# Patient Record
Sex: MALE
[De-identification: names, ages, dates, MRNs, and addresses within clinical notes are randomized per-mention and may not be internally consistent; named-entity substitution may affect disease eponyms.]

---

## 2018-12-26 NOTE — ASU PATIENT PROFILE, ADULT - PMH
Arthritis    Prostate mass  benign non nodular prostatic hyperplasia without urinary tract symptoms  Spermatocele

## 2018-12-26 NOTE — ASU PATIENT PROFILE, ADULT - PSH
History of ankle surgery  bilateral ankles, right 10/19/2017  History of facial surgery  excision tumor of lip and reconstruction  S/P tonsillectomy

## 2018-12-27 ENCOUNTER — RESULT REVIEW (OUTPATIENT)
Age: 58
End: 2018-12-27

## 2018-12-27 ENCOUNTER — OUTPATIENT (OUTPATIENT)
Dept: OUTPATIENT SERVICES | Facility: HOSPITAL | Age: 58
LOS: 1 days | Discharge: HOME | End: 2018-12-27
Payer: COMMERCIAL

## 2018-12-27 VITALS
RESPIRATION RATE: 14 BRPM | HEART RATE: 66 BPM | WEIGHT: 205.03 LBS | TEMPERATURE: 98 F | HEIGHT: 72 IN | SYSTOLIC BLOOD PRESSURE: 116 MMHG | OXYGEN SATURATION: 98 % | DIASTOLIC BLOOD PRESSURE: 75 MMHG

## 2018-12-27 VITALS
OXYGEN SATURATION: 97 % | SYSTOLIC BLOOD PRESSURE: 116 MMHG | RESPIRATION RATE: 16 BRPM | DIASTOLIC BLOOD PRESSURE: 77 MMHG | TEMPERATURE: 98 F | HEART RATE: 65 BPM

## 2018-12-27 DIAGNOSIS — Z98.890 OTHER SPECIFIED POSTPROCEDURAL STATES: Chronic | ICD-10-CM

## 2018-12-27 DIAGNOSIS — Z90.89 ACQUIRED ABSENCE OF OTHER ORGANS: Chronic | ICD-10-CM

## 2018-12-27 PROCEDURE — 88304 TISSUE EXAM BY PATHOLOGIST: CPT | Mod: 26

## 2018-12-27 RX ORDER — FENTANYL CITRATE 50 UG/ML
50 INJECTION INTRAVENOUS
Qty: 0 | Refills: 0 | Status: DISCONTINUED | OUTPATIENT
Start: 2018-12-27 | End: 2018-12-27

## 2018-12-27 RX ORDER — OXYCODONE HYDROCHLORIDE 5 MG/1
5 TABLET ORAL ONCE
Qty: 0 | Refills: 0 | Status: DISCONTINUED | OUTPATIENT
Start: 2018-12-27 | End: 2018-12-27

## 2018-12-27 RX ORDER — SODIUM CHLORIDE 9 MG/ML
1000 INJECTION, SOLUTION INTRAVENOUS
Qty: 0 | Refills: 0 | Status: DISCONTINUED | OUTPATIENT
Start: 2018-12-27 | End: 2019-01-11

## 2018-12-27 RX ORDER — MELOXICAM 15 MG/1
1 TABLET ORAL
Qty: 0 | Refills: 0 | COMMUNITY

## 2018-12-27 RX ORDER — OXYCODONE HYDROCHLORIDE 5 MG/1
1 TABLET ORAL
Qty: 0 | Refills: 0 | COMMUNITY
Start: 2018-12-27

## 2018-12-27 RX ORDER — ONDANSETRON 8 MG/1
4 TABLET, FILM COATED ORAL ONCE
Qty: 0 | Refills: 0 | Status: DISCONTINUED | OUTPATIENT
Start: 2018-12-27 | End: 2019-01-11

## 2018-12-27 RX ORDER — OXYCODONE HYDROCHLORIDE 5 MG/1
10 TABLET ORAL ONCE
Qty: 0 | Refills: 0 | Status: DISCONTINUED | OUTPATIENT
Start: 2018-12-27 | End: 2018-12-27

## 2018-12-27 NOTE — ASU DISCHARGE PLAN (ADULT/PEDIATRIC). - MEDICATION SUMMARY - MEDICATIONS TO TAKE
I will START or STAY ON the medications listed below when I get home from the hospital:    Aleve  -- Indication: For Spermatocele    oxyCODONE 5 mg oral tablet  -- 1 tab(s) by mouth once, As needed, Moderate Pain (4 - 6)  -- Indication: For History of ankle surgery    oxyCODONE 10 mg oral tablet  -- 1 tab(s) by mouth once, As needed, Severe Pain (7 - 10)  -- Indication: For History of ankle surgery

## 2018-12-31 LAB — SURGICAL PATHOLOGY FINAL REPORT - CH: SIGNIFICANT CHANGE UP

## 2019-01-01 DIAGNOSIS — N43.40 SPERMATOCELE OF EPIDIDYMIS, UNSPECIFIED: ICD-10-CM

## 2019-01-01 DIAGNOSIS — Z88.5 ALLERGY STATUS TO NARCOTIC AGENT: ICD-10-CM

## 2019-01-24 ENCOUNTER — EMERGENCY (EMERGENCY)
Facility: HOSPITAL | Age: 59
LOS: 0 days | Discharge: HOME | End: 2019-01-24
Attending: EMERGENCY MEDICINE | Admitting: EMERGENCY MEDICINE
Payer: COMMERCIAL

## 2019-01-24 VITALS
TEMPERATURE: 98 F | RESPIRATION RATE: 17 BRPM | HEART RATE: 74 BPM | OXYGEN SATURATION: 94 % | DIASTOLIC BLOOD PRESSURE: 70 MMHG | SYSTOLIC BLOOD PRESSURE: 118 MMHG

## 2019-01-24 VITALS — WEIGHT: 205.03 LBS | HEIGHT: 72 IN

## 2019-01-24 DIAGNOSIS — M62.830 MUSCLE SPASM OF BACK: ICD-10-CM

## 2019-01-24 DIAGNOSIS — Z98.890 OTHER SPECIFIED POSTPROCEDURAL STATES: Chronic | ICD-10-CM

## 2019-01-24 DIAGNOSIS — Z90.89 ACQUIRED ABSENCE OF OTHER ORGANS: Chronic | ICD-10-CM

## 2019-01-24 DIAGNOSIS — M19.90 UNSPECIFIED OSTEOARTHRITIS, UNSPECIFIED SITE: ICD-10-CM

## 2019-01-24 DIAGNOSIS — M54.5 LOW BACK PAIN: ICD-10-CM

## 2019-01-24 PROBLEM — N43.40 SPERMATOCELE OF EPIDIDYMIS, UNSPECIFIED: Chronic | Status: ACTIVE | Noted: 2018-12-26

## 2019-01-24 PROBLEM — N42.9 DISORDER OF PROSTATE, UNSPECIFIED: Chronic | Status: ACTIVE | Noted: 2018-12-26

## 2019-01-24 LAB
ALBUMIN SERPL ELPH-MCNC: 3.9 G/DL — SIGNIFICANT CHANGE UP (ref 3.3–5)
ALP SERPL-CCNC: 100 U/L — SIGNIFICANT CHANGE UP (ref 40–120)
ALT FLD-CCNC: 36 U/L — SIGNIFICANT CHANGE UP (ref 12–78)
ANION GAP SERPL CALC-SCNC: 9 MMOL/L — SIGNIFICANT CHANGE UP (ref 5–17)
APPEARANCE UR: CLEAR — SIGNIFICANT CHANGE UP
AST SERPL-CCNC: 22 U/L — SIGNIFICANT CHANGE UP (ref 15–37)
BACTERIA # UR AUTO: ABNORMAL
BASOPHILS # BLD AUTO: 0.11 K/UL — SIGNIFICANT CHANGE UP (ref 0–0.2)
BASOPHILS NFR BLD AUTO: 1 % — SIGNIFICANT CHANGE UP (ref 0–2)
BILIRUB SERPL-MCNC: 0.7 MG/DL — SIGNIFICANT CHANGE UP (ref 0.2–1.2)
BILIRUB UR-MCNC: NEGATIVE — SIGNIFICANT CHANGE UP
BUN SERPL-MCNC: 20 MG/DL — SIGNIFICANT CHANGE UP (ref 7–23)
CALCIUM SERPL-MCNC: 9.2 MG/DL — SIGNIFICANT CHANGE UP (ref 8.5–10.1)
CHLORIDE SERPL-SCNC: 106 MMOL/L — SIGNIFICANT CHANGE UP (ref 96–108)
CO2 SERPL-SCNC: 25 MMOL/L — SIGNIFICANT CHANGE UP (ref 22–31)
COLOR SPEC: YELLOW — SIGNIFICANT CHANGE UP
CREAT SERPL-MCNC: 1.21 MG/DL — SIGNIFICANT CHANGE UP (ref 0.5–1.3)
DIFF PNL FLD: NEGATIVE — SIGNIFICANT CHANGE UP
EOSINOPHIL # BLD AUTO: 0.13 K/UL — SIGNIFICANT CHANGE UP (ref 0–0.5)
EOSINOPHIL NFR BLD AUTO: 1.2 % — SIGNIFICANT CHANGE UP (ref 0–6)
EPI CELLS # UR: NEGATIVE — SIGNIFICANT CHANGE UP
GLUCOSE SERPL-MCNC: 124 MG/DL — HIGH (ref 70–99)
GLUCOSE UR QL: NEGATIVE MG/DL — SIGNIFICANT CHANGE UP
HCT VFR BLD CALC: 43.3 % — SIGNIFICANT CHANGE UP (ref 39–50)
HGB BLD-MCNC: 14.8 G/DL — SIGNIFICANT CHANGE UP (ref 13–17)
IMM GRANULOCYTES NFR BLD AUTO: 0.2 % — SIGNIFICANT CHANGE UP (ref 0–1.5)
KETONES UR-MCNC: NEGATIVE — SIGNIFICANT CHANGE UP
LEUKOCYTE ESTERASE UR-ACNC: ABNORMAL
LYMPHOCYTES # BLD AUTO: 0.97 K/UL — LOW (ref 1–3.3)
LYMPHOCYTES # BLD AUTO: 9 % — LOW (ref 13–44)
MCHC RBC-ENTMCNC: 33.7 PG — SIGNIFICANT CHANGE UP (ref 27–34)
MCHC RBC-ENTMCNC: 34.2 GM/DL — SIGNIFICANT CHANGE UP (ref 32–36)
MCV RBC AUTO: 98.6 FL — SIGNIFICANT CHANGE UP (ref 80–100)
MONOCYTES # BLD AUTO: 1.09 K/UL — HIGH (ref 0–0.9)
MONOCYTES NFR BLD AUTO: 10.1 % — SIGNIFICANT CHANGE UP (ref 2–14)
NEUTROPHILS # BLD AUTO: 8.42 K/UL — HIGH (ref 1.8–7.4)
NEUTROPHILS NFR BLD AUTO: 78.5 % — HIGH (ref 43–77)
NITRITE UR-MCNC: NEGATIVE — SIGNIFICANT CHANGE UP
NRBC # BLD: 0 /100 WBCS — SIGNIFICANT CHANGE UP (ref 0–0)
PH UR: 7 — SIGNIFICANT CHANGE UP (ref 5–8)
PLATELET # BLD AUTO: 206 K/UL — SIGNIFICANT CHANGE UP (ref 150–400)
POTASSIUM SERPL-MCNC: 4.5 MMOL/L — SIGNIFICANT CHANGE UP (ref 3.5–5.3)
POTASSIUM SERPL-SCNC: 4.5 MMOL/L — SIGNIFICANT CHANGE UP (ref 3.5–5.3)
PROT SERPL-MCNC: 7.2 GM/DL — SIGNIFICANT CHANGE UP (ref 6–8.3)
PROT UR-MCNC: 15 MG/DL
RBC # BLD: 4.39 M/UL — SIGNIFICANT CHANGE UP (ref 4.2–5.8)
RBC # FLD: 12.1 % — SIGNIFICANT CHANGE UP (ref 10.3–14.5)
RBC CASTS # UR COMP ASSIST: SIGNIFICANT CHANGE UP /HPF (ref 0–4)
SODIUM SERPL-SCNC: 140 MMOL/L — SIGNIFICANT CHANGE UP (ref 135–145)
SP GR SPEC: 1.01 — SIGNIFICANT CHANGE UP (ref 1.01–1.02)
UROBILINOGEN FLD QL: NEGATIVE MG/DL — SIGNIFICANT CHANGE UP
WBC # BLD: 10.74 K/UL — HIGH (ref 3.8–10.5)
WBC # FLD AUTO: 10.74 K/UL — HIGH (ref 3.8–10.5)
WBC UR QL: SIGNIFICANT CHANGE UP

## 2019-01-24 PROCEDURE — 99284 EMERGENCY DEPT VISIT MOD MDM: CPT | Mod: 25

## 2019-01-24 RX ORDER — DIAZEPAM 5 MG
5 TABLET ORAL ONCE
Qty: 0 | Refills: 0 | Status: DISCONTINUED | OUTPATIENT
Start: 2019-01-24 | End: 2019-01-24

## 2019-01-24 RX ORDER — IBUPROFEN 200 MG
1 TABLET ORAL
Qty: 15 | Refills: 0 | OUTPATIENT
Start: 2019-01-24 | End: 2019-01-28

## 2019-01-24 RX ORDER — OXYCODONE AND ACETAMINOPHEN 5; 325 MG/1; MG/1
1 TABLET ORAL ONCE
Qty: 0 | Refills: 0 | Status: DISCONTINUED | OUTPATIENT
Start: 2019-01-24 | End: 2019-01-24

## 2019-01-24 RX ORDER — METHOCARBAMOL 500 MG/1
1 TABLET, FILM COATED ORAL
Qty: 10 | Refills: 0 | OUTPATIENT
Start: 2019-01-24 | End: 2019-01-28

## 2019-01-24 RX ORDER — LIDOCAINE 4 G/100G
1 CREAM TOPICAL ONCE
Qty: 0 | Refills: 0 | Status: COMPLETED | OUTPATIENT
Start: 2019-01-24 | End: 2019-01-24

## 2019-01-24 RX ORDER — DEXAMETHASONE 0.5 MG/5ML
8 ELIXIR ORAL ONCE
Qty: 0 | Refills: 0 | Status: COMPLETED | OUTPATIENT
Start: 2019-01-24 | End: 2019-01-24

## 2019-01-24 RX ORDER — KETOROLAC TROMETHAMINE 30 MG/ML
15 SYRINGE (ML) INJECTION ONCE
Qty: 0 | Refills: 0 | Status: DISCONTINUED | OUTPATIENT
Start: 2019-01-24 | End: 2019-01-24

## 2019-01-24 RX ADMIN — OXYCODONE AND ACETAMINOPHEN 1 TABLET(S): 5; 325 TABLET ORAL at 16:53

## 2019-01-24 RX ADMIN — Medication 5 MILLIGRAM(S): at 15:09

## 2019-01-24 RX ADMIN — LIDOCAINE 1 PATCH: 4 CREAM TOPICAL at 15:08

## 2019-01-24 RX ADMIN — Medication 15 MILLIGRAM(S): at 16:53

## 2019-01-24 RX ADMIN — Medication 8 MILLIGRAM(S): at 15:09

## 2019-01-24 NOTE — ED ADULT TRIAGE NOTE - CHIEF COMPLAINT QUOTE
Patient comes in with right lower back pain x 1 day. patient denies injury. patient states he had numbness in hand last night.

## 2019-01-24 NOTE — ED STATDOCS - CARE PROVIDER_API CALL
Devin Gill), Orthopaedic Surgery  [address] [facility]  [address] [place] [address]  Phone: [phone]  Fax: [phone]

## 2019-01-24 NOTE — ED STATDOCS - PROGRESS NOTE DETAILS
57 y/o M with a PMHx of Arthritis, on Aleve, presenting to the ED with wife c/o lower right spasming back pain starting last night. Pain radiating to buttocks. No recent falls or other trauma. Denies any leg pain, extremity weakness, dysuria, hematuria, incontinence, CP, SOB, abd pain, N/V/D, fevers, or chills. Last dose Aleve 0600 this morning. Allergic to Demerol. PE back: moderate tenderness to right lower back, no L-Spine tenderness, no CVA tenderness. plan: pain meds, kidney fx testing, ivfs and reeval. -Kirstie Quiñonez PA-C AJM: workup neg. pt feeling improved. ambulatory. dc home. All results discussed with the patient, and a copy of results has been provided. Patient is comfortable with dc plan for home. Opportunity for questions was provided and all questions have been adressed.

## 2019-01-24 NOTE — ED STATDOCS - DATA REVIEWED, MDM
Discussion/Summary   Right hand fifth digit is unremarkable for fracture or dislocation. Verified Results  XR FINGER 5TH RT MIN 2V [id number] 10:50AM AMANDEEP SCRUGGS     Test Name Result Flag Reference   XR FINGER 5TH RT MIN 2V (Report)     Accession #    [id number]    EXAM: XR FINGER 5TH RT MIN 2V    CLINICAL INDICATION: 73-year-old male presents with complaint of pain status post falling. COMPARISON: None. FINDINGS: The bony structures, soft tissues and joint spaces are unremarkable. There is no   evidence for a fracture or dislocation. IMPRESSION:     Unremarkable right 5th finger.     **** F I N A L ****    Transcribed By: Angelica Blum   09/10/18 2:51 pm    Dictated By:      Екатерина Mendoza MD    Electronically Reviewed and Approved By:      Екатерина Mendoza MD 09/10/18 2:54 pm
vital signs

## 2019-01-24 NOTE — ED ADULT NURSE NOTE - OBJECTIVE STATEMENT
pt complaining of right lower back pain that started overnight. pt denies any trauma, fall, or known strain to back. pt took 3 aleve without relief. denies hx of kidney stones. tender when touching area

## 2019-01-24 NOTE — ED STATDOCS - OBJECTIVE STATEMENT
57 y/o M with a PMHx of Arthritis, on Aleve, presenting to the ED with wife c/o lower right spasming back pain starting last night. Pain radiating to buttocks. No recent falls or other trauma. Denies any leg pain, extremity weakness, dysuria, hematuria, incontinence, CP, SOB, abd pain, N/V/D, fevers, or chills. Last dose Aleve 0600 this morning. Allergic to Demerol. 59 y/o M with a PMHx of Arthritis, on Aleve, presenting to the ED with wife c/o lower right spasming back pain starting last night. Pain radiating to buttocks. No recent falls or other trauma. Denies any leg pain, extremity weakness, dysuria, hematuria, incontinence, CP, SOB, abd pain, N/V/D, fevers, or chills. Last dose Aleve 0600 this morning. No trauma. No abd pain. No testicular pain. Allergic to Demerol.

## 2019-01-24 NOTE — ED STATDOCS - MEDICAL DECISION MAKING DETAILS
Pt presenting with muscle spasm back pain. No red flag back pain signs. Will treat with Valium, Lidoderm, Dexamethasone. Check kidney function and if normal, add NSAID.

## 2019-01-26 ENCOUNTER — INPATIENT (INPATIENT)
Facility: HOSPITAL | Age: 59
LOS: 1 days | Discharge: HOME | End: 2019-01-28
Attending: ORTHOPAEDIC SURGERY | Admitting: ORTHOPAEDIC SURGERY
Payer: COMMERCIAL

## 2019-01-26 VITALS
TEMPERATURE: 98 F | HEART RATE: 68 BPM | SYSTOLIC BLOOD PRESSURE: 117 MMHG | DIASTOLIC BLOOD PRESSURE: 74 MMHG | OXYGEN SATURATION: 96 % | RESPIRATION RATE: 16 BRPM

## 2019-01-26 DIAGNOSIS — Z90.89 ACQUIRED ABSENCE OF OTHER ORGANS: Chronic | ICD-10-CM

## 2019-01-26 DIAGNOSIS — Z98.890 OTHER SPECIFIED POSTPROCEDURAL STATES: Chronic | ICD-10-CM

## 2019-01-26 LAB
ADD ON TEST-SPECIMEN IN LAB: SIGNIFICANT CHANGE UP
ALBUMIN SERPL ELPH-MCNC: 3.2 G/DL — LOW (ref 3.3–5)
ALP SERPL-CCNC: 82 U/L — SIGNIFICANT CHANGE UP (ref 40–120)
ALT FLD-CCNC: 26 U/L — SIGNIFICANT CHANGE UP (ref 12–78)
ANION GAP SERPL CALC-SCNC: 8 MMOL/L — SIGNIFICANT CHANGE UP (ref 5–17)
AST SERPL-CCNC: 17 U/L — SIGNIFICANT CHANGE UP (ref 15–37)
BILIRUB SERPL-MCNC: 0.5 MG/DL — SIGNIFICANT CHANGE UP (ref 0.2–1.2)
BUN SERPL-MCNC: 16 MG/DL — SIGNIFICANT CHANGE UP (ref 7–23)
CALCIUM SERPL-MCNC: 8.7 MG/DL — SIGNIFICANT CHANGE UP (ref 8.5–10.1)
CHLORIDE SERPL-SCNC: 108 MMOL/L — SIGNIFICANT CHANGE UP (ref 96–108)
CO2 SERPL-SCNC: 25 MMOL/L — SIGNIFICANT CHANGE UP (ref 22–31)
CREAT SERPL-MCNC: 1.12 MG/DL — SIGNIFICANT CHANGE UP (ref 0.5–1.3)
GLUCOSE SERPL-MCNC: 160 MG/DL — HIGH (ref 70–99)
HCT VFR BLD CALC: 42 % — SIGNIFICANT CHANGE UP (ref 39–50)
HGB BLD-MCNC: 14 G/DL — SIGNIFICANT CHANGE UP (ref 13–17)
MCHC RBC-ENTMCNC: 33.3 GM/DL — SIGNIFICANT CHANGE UP (ref 32–36)
MCHC RBC-ENTMCNC: 33.7 PG — SIGNIFICANT CHANGE UP (ref 27–34)
MCV RBC AUTO: 101 FL — HIGH (ref 80–100)
NRBC # BLD: 0 /100 WBCS — SIGNIFICANT CHANGE UP (ref 0–0)
PLATELET # BLD AUTO: 169 K/UL — SIGNIFICANT CHANGE UP (ref 150–400)
POTASSIUM SERPL-MCNC: 4.4 MMOL/L — SIGNIFICANT CHANGE UP (ref 3.5–5.3)
POTASSIUM SERPL-SCNC: 4.4 MMOL/L — SIGNIFICANT CHANGE UP (ref 3.5–5.3)
PROT SERPL-MCNC: 6.8 GM/DL — SIGNIFICANT CHANGE UP (ref 6–8.3)
RBC # BLD: 4.16 M/UL — LOW (ref 4.2–5.8)
RBC # FLD: 12.8 % — SIGNIFICANT CHANGE UP (ref 10.3–14.5)
SODIUM SERPL-SCNC: 141 MMOL/L — SIGNIFICANT CHANGE UP (ref 135–145)
WBC # BLD: 10.9 K/UL — HIGH (ref 3.8–10.5)
WBC # FLD AUTO: 10.9 K/UL — HIGH (ref 3.8–10.5)

## 2019-01-26 PROCEDURE — 74176 CT ABD & PELVIS W/O CONTRAST: CPT | Mod: 26

## 2019-01-26 PROCEDURE — 72100 X-RAY EXAM L-S SPINE 2/3 VWS: CPT | Mod: 26

## 2019-01-26 PROCEDURE — 99285 EMERGENCY DEPT VISIT HI MDM: CPT

## 2019-01-26 RX ORDER — CYCLOBENZAPRINE HYDROCHLORIDE 10 MG/1
5 TABLET, FILM COATED ORAL ONCE
Qty: 0 | Refills: 0 | Status: COMPLETED | OUTPATIENT
Start: 2019-01-26 | End: 2019-01-26

## 2019-01-26 RX ORDER — SENNA PLUS 8.6 MG/1
2 TABLET ORAL AT BEDTIME
Qty: 0 | Refills: 0 | Status: DISCONTINUED | OUTPATIENT
Start: 2019-01-26 | End: 2019-01-28

## 2019-01-26 RX ORDER — DIAZEPAM 5 MG
5 TABLET ORAL ONCE
Qty: 0 | Refills: 0 | Status: DISCONTINUED | OUTPATIENT
Start: 2019-01-26 | End: 2019-01-26

## 2019-01-26 RX ORDER — LIDOCAINE 4 G/100G
1 CREAM TOPICAL EVERY 24 HOURS
Qty: 0 | Refills: 0 | Status: DISCONTINUED | OUTPATIENT
Start: 2019-01-26 | End: 2019-01-28

## 2019-01-26 RX ORDER — PANTOPRAZOLE SODIUM 20 MG/1
40 TABLET, DELAYED RELEASE ORAL
Qty: 0 | Refills: 0 | Status: DISCONTINUED | OUTPATIENT
Start: 2019-01-26 | End: 2019-01-28

## 2019-01-26 RX ORDER — OXYCODONE HYDROCHLORIDE 5 MG/1
10 TABLET ORAL EVERY 4 HOURS
Qty: 0 | Refills: 0 | Status: DISCONTINUED | OUTPATIENT
Start: 2019-01-26 | End: 2019-01-28

## 2019-01-26 RX ORDER — CYCLOBENZAPRINE HYDROCHLORIDE 10 MG/1
10 TABLET, FILM COATED ORAL THREE TIMES A DAY
Qty: 0 | Refills: 0 | Status: DISCONTINUED | OUTPATIENT
Start: 2019-01-26 | End: 2019-01-28

## 2019-01-26 RX ORDER — DOCUSATE SODIUM 100 MG
100 CAPSULE ORAL THREE TIMES A DAY
Qty: 0 | Refills: 0 | Status: DISCONTINUED | OUTPATIENT
Start: 2019-01-26 | End: 2019-01-28

## 2019-01-26 RX ORDER — ONDANSETRON 8 MG/1
4 TABLET, FILM COATED ORAL EVERY 6 HOURS
Qty: 0 | Refills: 0 | Status: DISCONTINUED | OUTPATIENT
Start: 2019-01-26 | End: 2019-01-28

## 2019-01-26 RX ORDER — OXYCODONE AND ACETAMINOPHEN 5; 325 MG/1; MG/1
2 TABLET ORAL ONCE
Qty: 0 | Refills: 0 | Status: DISCONTINUED | OUTPATIENT
Start: 2019-01-26 | End: 2019-01-26

## 2019-01-26 RX ORDER — KETOROLAC TROMETHAMINE 30 MG/ML
30 SYRINGE (ML) INJECTION ONCE
Qty: 0 | Refills: 0 | Status: DISCONTINUED | OUTPATIENT
Start: 2019-01-26 | End: 2019-01-26

## 2019-01-26 RX ORDER — ACETAMINOPHEN 500 MG
650 TABLET ORAL EVERY 6 HOURS
Qty: 0 | Refills: 0 | Status: DISCONTINUED | OUTPATIENT
Start: 2019-01-26 | End: 2019-01-28

## 2019-01-26 RX ORDER — OXYCODONE HYDROCHLORIDE 5 MG/1
5 TABLET ORAL EVERY 4 HOURS
Qty: 0 | Refills: 0 | Status: DISCONTINUED | OUTPATIENT
Start: 2019-01-26 | End: 2019-01-28

## 2019-01-26 RX ORDER — SODIUM CHLORIDE 9 MG/ML
3 INJECTION INTRAMUSCULAR; INTRAVENOUS; SUBCUTANEOUS ONCE
Qty: 0 | Refills: 0 | Status: COMPLETED | OUTPATIENT
Start: 2019-01-26 | End: 2019-01-26

## 2019-01-26 RX ORDER — HYDROMORPHONE HYDROCHLORIDE 2 MG/ML
0.5 INJECTION INTRAMUSCULAR; INTRAVENOUS; SUBCUTANEOUS
Qty: 0 | Refills: 0 | Status: DISCONTINUED | OUTPATIENT
Start: 2019-01-26 | End: 2019-01-28

## 2019-01-26 RX ADMIN — OXYCODONE AND ACETAMINOPHEN 2 TABLET(S): 5; 325 TABLET ORAL at 15:52

## 2019-01-26 RX ADMIN — SODIUM CHLORIDE 3 MILLILITER(S): 9 INJECTION INTRAMUSCULAR; INTRAVENOUS; SUBCUTANEOUS at 18:52

## 2019-01-26 RX ADMIN — Medication 125 MILLIGRAM(S): at 18:52

## 2019-01-26 RX ADMIN — OXYCODONE AND ACETAMINOPHEN 2 TABLET(S): 5; 325 TABLET ORAL at 15:22

## 2019-01-26 RX ADMIN — CYCLOBENZAPRINE HYDROCHLORIDE 5 MILLIGRAM(S): 10 TABLET, FILM COATED ORAL at 23:30

## 2019-01-26 RX ADMIN — Medication 30 MILLIGRAM(S): at 19:07

## 2019-01-26 RX ADMIN — Medication 5 MILLIGRAM(S): at 18:52

## 2019-01-26 RX ADMIN — Medication 30 MILLIGRAM(S): at 18:52

## 2019-01-26 NOTE — ED PROVIDER NOTE - TOBACCO USE
Secondary to severe anemia, see above  Will assess for fall risk prior to dc home      Unknown if ever smoked

## 2019-01-26 NOTE — ED ADULT NURSE NOTE - CHPI ED NUR SYMPTOMS NEG
no tingling/no decreased eating/drinking/no nausea/no fever/no weakness/no chills/no vomiting/no dizziness

## 2019-01-26 NOTE — H&P ADULT - HISTORY OF PRESENT ILLNESS
59 y/o male with a PMHx of Arthritis presents to the ED c/o worsening back pain for the past week. Pt was seen in ED thursday for similar pain, was given muscle relaxers and discharged. Pt last took Ibuprofen and the muscle relaxers today right before coming in to the ED. Patient initially felt better but over the course of the past few days has become worse to the point the patient couldn't get out of bed today and had to call ambulance to ED. Patient denies any history if injury, overuse, or back straining incidents. Denies any radicular symptoms and no history of this pain or any back injuries in the past. Patient denies any issues with his bowel or bladder habbits, just that he has felt constipated and admits that it was so painful to sit down over the past day he wasn't able ambulate and have bowel movement.  Denies any fevers/chills/numbness/weakness/radicular symptoms    Patient recently had surgery at the end of december to remove a spermatocele with Dr. Mendez on 12/27/18.     CT in Xray was negative for Kidney stones    PAST MEDICAL & SURGICAL HISTORY:  Prostate mass: benign non nodular prostatic hyperplasia without urinary tract symptoms  Spermatocele  Arthritis  History of facial surgery: excision tumor of lip and reconstruction  S/P tonsillectomy  History of ankle surgery: bilateral ankles, right 10/19/2017    Home Medications:  Aleve:  (27 Dec 2018 13:10)  oxyCODONE 10 mg oral tablet: 1 tab(s) orally once, As needed, Severe Pain (7 - 10) (27 Dec 2018 15:26)  oxyCODONE 5 mg oral tablet: 1 tab(s) orally once, As needed, Moderate Pain (4 - 6) (27 Dec 2018 15:26)    Allergies  Demerol HCl (Unknown)    Vital Signs Last 24 Hrs  T(C): 36.5 (26 Jan 2019 14:35), Max: 36.6 (26 Jan 2019 14:33)  T(F): 97.7 (26 Jan 2019 14:35), Max: 97.9 (26 Jan 2019 14:33)  HR: 71 (26 Jan 2019 14:35) (68 - 71)  BP: 110/76 (26 Jan 2019 14:35) (110/76 - 117/74)  BP(mean): --  RR: 18 (26 Jan 2019 14:35) (16 - 18)  SpO2: 97% (26 Jan 2019 14:35) (96% - 97%)    PHYSICAL EXAM:  General; NAD w/ Wife at bedside  Hips/Pelvis:   Able to SLR bilaterally, although pain in his lumbar paraspinal region with Right sided SLR, Also experienced pain in his lumbar paraspinals with ankle plantar flexion on the right side. Negative log roll, heel strike. No pain on passive Int/Ext hip rotation.  Spine exam:  Neck: supple, NT,   Back: Tender and Tight paraspinal muscles Right Lumbar region, no bony tenderness to palpation midline of cervical, thoracic, lumbar spine  Spine:                       Motor exam:          Right Upper extremity: Delt 5/5, Biceps 5/5, Triceps 5/5, Wrist Ext 5/5, Wrist Flexion 5/5,  Strength 5/5         SILT C5-S1, No Clonus, Negative Adan, +RP, Compartments soft           Left Upper extremity: Delt 5/5, Biceps 5/5, Triceps 5/5, Wrist Ext 5/5, Wrist Flexion 5/5,  Strength 5/5         SILT C5-S1, No Clonus, Negative Adan, +RP, Compartments soft           Right Lower Extremity: Hip Flexion 5/5, Hip Extension 5/5, Knee Flexion 5/5, Knee Extension 5/5, Ankle Dorsiflexion 5/5,          Ankle Plantar Flexion 5/5, Extensor hallucis Longus 5/5         SILT L2-S1, No pain with SLR, Negative Clonus, Negative Babinski                  Left Lower Extremity: Hip Flexion 5/5, Hip Extension 5/5, Knee Flexion 5/5, Knee Extension 5/5, Ankle Dorsiflexion 5/5,          Ankle Plantar Flexion 5/5, Extensor hallucis Longus 5/5         SILT L2-S1, No pain with SLR, Negative Clonus, Negative Babinski

## 2019-01-26 NOTE — H&P ADULT - ASSESSMENT
A/P:  59 yo M w/ Right sided intractable low back pain and inability to ambulate 2/2 to pain  Admit for pain control and muscle relaxers  WBAT, PT consult tomorrow  MRI tomorrow to rule out lumbar pathology not seen on Xray/CT imaging  FU Labs  Pain control  Follow up CT official results  Dr. Soria Aware of plan and agrees with plan

## 2019-01-26 NOTE — ED ADULT NURSE NOTE - NSIMPLEMENTINTERV_GEN_ALL_ED
Implemented All Fall Risk Interventions:  [place] to call system. Call bell, personal items and telephone within reach. Instruct patient to call for assistance. Room bathroom lighting operational. Non-slip footwear when patient is off stretcher. Physically safe environment: no spills, clutter or unnecessary equipment. Stretcher in lowest position, wheels locked, appropriate side rails in place. Provide visual cue, wrist band, yellow gown, etc. Monitor gait and stability. Monitor for mental status changes and reorient to person, place, and time. Review medications for side effects contributing to fall risk. Reinforce activity limits and safety measures with patient and family.

## 2019-01-26 NOTE — CONSULT NOTE ADULT - SUBJECTIVE AND OBJECTIVE BOX
H  Pt denies any bowel/bladder dysfunction.     PAST MEDICAL & SURGICAL HISTORY:  Prostate mass: benign non nodular prostatic hyperplasia without urinary tract symptoms  Spermatocele  Arthritis  History of facial surgery: excision tumor of lip and reconstruction  S/P tonsillectomy  History of ankle surgery: bilateral ankles, right 10/19/2017    Home Medications:  Aleve:  (27 Dec 2018 13:10)  oxyCODONE 10 mg oral tablet: 1 tab(s) orally once, As needed, Severe Pain (7 - 10) (27 Dec 2018 15:26)  oxyCODONE 5 mg oral tablet: 1 tab(s) orally once, As needed, Moderate Pain (4 - 6) (27 Dec 2018 15:26)    Allergies    Demerol HCl (Unknown)    Intolerances                                                        CHIEF COMPLAINT:     HPI: Pt is a 58y complains of pain **, patient denies changes in bowel or bladder, Denies pain down legs, denies paresthesias, Symptoms began **.     PAST MEDICAL & SURGICAL HISTORY:  Prostate mass: benign non nodular prostatic hyperplasia without urinary tract symptoms  Spermatocele  Arthritis  History of facial surgery: excision tumor of lip and reconstruction  S/P tonsillectomy  History of ankle surgery: bilateral ankles, right 10/19/2017      FAMILY HISTORY:      SOCIAL HISTORY:     Vital Signs Last 24 Hrs  T(C): 36.5 (26 Jan 2019 14:35), Max: 36.6 (26 Jan 2019 14:33)  T(F): 97.7 (26 Jan 2019 14:35), Max: 97.9 (26 Jan 2019 14:33)  HR: 71 (26 Jan 2019 14:35) (68 - 71)  BP: 110/76 (26 Jan 2019 14:35) (110/76 - 117/74)  BP(mean): --  RR: 18 (26 Jan 2019 14:35) (16 - 18)  SpO2: 97% (26 Jan 2019 14:35) (96% - 97%)  I&O's Detail      LABS:      RADIOLOGY & ADDITIONAL STUDIES:    PHYSICAL EXAM:  General; Awake and alert, Oriented x 3  Hips/Pelvis:   Able to SLR bilaterally. Negative log roll, heel strike. No pain on passive Int/Ext hip rotation.  Spine exam:  Neck: supple, NT, Full Painless baseline AROM  Back:   Spine:                       Motor exam:          Right Upper extremity: Delt 5/5, Biceps 5/5, Triceps 5/5, Wrist Ext 5/5, Wrist Flexion 5/5,  Strength 5/5         SILT C5-S1, No Clonus, Negative Adan, +RP, Compartments soft           Left Upper extremity: Delt 5/5, Biceps 5/5, Triceps 5/5, Wrist Ext 5/5, Wrist Flexion 5/5,  Strength 5/5         SILT C5-S1, No Clonus, Negative Adan, +RP, Compartments soft           Right Lower Extremity: Hip Flexion 5/5, Hip Extension 5/5, Knee Flexion 5/5, Knee Extension 5/5, Ankle Dorsiflexion 5/5,          Ankle Plantar Flexion 5/5, Extensor hallucis Longus 5/5         SILT L2-S1, No pain with SLR, Negative Clonus, Negative Babinski                  Left Lower Extremity: Hip Flexion 5/5, Hip Extension 5/5, Knee Flexion 5/5, Knee Extension 5/5, Ankle Dorsiflexion 5/5,          Ankle Plantar Flexion 5/5, Extensor hallucis Longus 5/5         SILT L2-S1, No pain with SLR, Negative Clonus, Negative Babinski H  Pt denies any bowel/bladder dysfunction.     PAST MEDICAL & SURGICAL HISTORY:  Prostate mass: benign non nodular prostatic hyperplasia without urinary tract symptoms  Spermatocele  Arthritis  History of facial surgery: excision tumor of lip and reconstruction  S/P tonsillectomy  History of ankle surgery: bilateral ankles, right 10/19/2017    Home Medications:  Aleve:  (27 Dec 2018 13:10)  oxyCODONE 10 mg oral tablet: 1 tab(s) orally once, As needed, Severe Pain (7 - 10) (27 Dec 2018 15:26)  oxyCODONE 5 mg oral tablet: 1 tab(s) orally once, As needed, Moderate Pain (4 - 6) (27 Dec 2018 15:26)    Allergies    Demerol HCl (Unknown)    Intolerances    Vital Signs Last 24 Hrs  T(C): 36.5 (26 Jan 2019 14:35), Max: 36.6 (26 Jan 2019 14:33)  T(F): 97.7 (26 Jan 2019 14:35), Max: 97.9 (26 Jan 2019 14:33)  HR: 71 (26 Jan 2019 14:35) (68 - 71)  BP: 110/76 (26 Jan 2019 14:35) (110/76 - 117/74)  BP(mean): --  RR: 18 (26 Jan 2019 14:35) (16 - 18)  SpO2: 97% (26 Jan 2019 14:35) (96% - 97%)    PHYSICAL EXAM:  General; NAD w/ Wife at bedside  Hips/Pelvis:   Able to SLR bilaterally. Negative log roll, heel strike. No pain on passive Int/Ext hip rotation.  Spine exam:  Neck: supple, NT, Full Painless baseline AROM  Back:   Spine:                       Motor exam:          Right Upper extremity: Delt 5/5, Biceps 5/5, Triceps 5/5, Wrist Ext 5/5, Wrist Flexion 5/5,  Strength 5/5         SILT C5-S1, No Clonus, Negative Adan, +RP, Compartments soft           Left Upper extremity: Delt 5/5, Biceps 5/5, Triceps 5/5, Wrist Ext 5/5, Wrist Flexion 5/5,  Strength 5/5         SILT C5-S1, No Clonus, Negative Adan, +RP, Compartments soft           Right Lower Extremity: Hip Flexion 5/5, Hip Extension 5/5, Knee Flexion 5/5, Knee Extension 5/5, Ankle Dorsiflexion 5/5,          Ankle Plantar Flexion 5/5, Extensor hallucis Longus 5/5         SILT L2-S1, No pain with SLR, Negative Clonus, Negative Babinski                  Left Lower Extremity: Hip Flexion 5/5, Hip Extension 5/5, Knee Flexion 5/5, Knee Extension 5/5, Ankle Dorsiflexion 5/5,          Ankle Plantar Flexion 5/5, Extensor hallucis Longus 5/5         SILT L2-S1, No pain with SLR, Negative Clonus, Negative Babinski

## 2019-01-26 NOTE — ED ADULT NURSE NOTE - OBJECTIVE STATEMENT
Patient presents to ED c/o worsening back pain x 3 days. Pt was seen in ED three days ago for his pain, was ibuprofen and percocet and went home. Pt last took Ibuprofen and percocet today PTA. States that he has been unable to move due and ambulate secondary to the pain. Denies any numbness or tingling in the back. Denies any recent falls. States that he was moving some heavy furniture 2 weeks ago and thinks "maybe I pulled a muscle". Patient is able to wiggle his toes; +pedal pulses. Denies any incontinence of urine or stool. States that he noticed his urine is "very dark". Patient offers no other complaints. Patient presents to ED c/o worsening back pain x 3 days. Pt was seen in ED three days ago for his pain, was given ibuprofen, robaxin and percocet and went home. Pt last took Ibuprofen, robaxin and percocet today PTA. States that he has been unable to move due and ambulate secondary to the pain. Denies any numbness or tingling in the back. Denies any recent falls. States that he was moving some heavy furniture 2 weeks ago and thinks "maybe I pulled a muscle". Patient is able to wiggle his toes; +pedal pulses. Denies any incontinence of urine or stool. States that he noticed his urine is "very dark". Patient offers no other complaints. Patient presents to ED c/o worsening back pain x 3 days. Pt was seen in [facility] three days ago for his pain, was given ibuprofen, robaxin and percocet and went home. Pt last took Ibuprofen, robaxin and percocet today PTA. States that he has been unable to move and ambulate secondary to the pain. Denies any numbness or tingling in the back. Denies any recent falls. States that he was moving some heavy furniture 2 weeks ago and thinks "maybe I pulled a muscle". Patient is able to wiggle his toes; +pedal pulses. Denies any incontinence of urine or stool. States that he noticed his urine is "very dark". Patient offers no other complaints.

## 2019-01-26 NOTE — ED PROVIDER NOTE - MUSCULOSKELETAL, MLM
Spine appears normal, range of motion is not limited. No midline TTP. +right buttock pain Spine appears normal, range of motion is not limited. No midline TTP. +right buttock/sacralilliac tenderness

## 2019-01-27 LAB
APPEARANCE UR: CLEAR — SIGNIFICANT CHANGE UP
BILIRUB UR-MCNC: NEGATIVE — SIGNIFICANT CHANGE UP
COLOR SPEC: YELLOW — SIGNIFICANT CHANGE UP
CRP SERPL-MCNC: 12.62 MG/DL — HIGH (ref 0–0.4)
DIFF PNL FLD: NEGATIVE — SIGNIFICANT CHANGE UP
ERYTHROCYTE [SEDIMENTATION RATE] IN BLOOD: 20 MM/HR — SIGNIFICANT CHANGE UP (ref 0–20)
GLUCOSE UR QL: 250 MG/DL
KETONES UR-MCNC: NEGATIVE — SIGNIFICANT CHANGE UP
LEUKOCYTE ESTERASE UR-ACNC: NEGATIVE — SIGNIFICANT CHANGE UP
NITRITE UR-MCNC: NEGATIVE — SIGNIFICANT CHANGE UP
PH UR: 5 — SIGNIFICANT CHANGE UP (ref 5–8)
PROT UR-MCNC: NEGATIVE MG/DL — SIGNIFICANT CHANGE UP
SP GR SPEC: 1.01 — SIGNIFICANT CHANGE UP (ref 1.01–1.02)
UROBILINOGEN FLD QL: NEGATIVE MG/DL — SIGNIFICANT CHANGE UP

## 2019-01-27 PROCEDURE — 72148 MRI LUMBAR SPINE W/O DYE: CPT | Mod: 26

## 2019-01-27 RX ORDER — DEXAMETHASONE 0.5 MG/5ML
2 ELIXIR ORAL EVERY 6 HOURS
Qty: 0 | Refills: 0 | Status: DISCONTINUED | OUTPATIENT
Start: 2019-01-28 | End: 2019-01-28

## 2019-01-27 RX ORDER — DEXAMETHASONE 0.5 MG/5ML
ELIXIR ORAL
Qty: 0 | Refills: 0 | Status: DISCONTINUED | OUTPATIENT
Start: 2019-01-27 | End: 2019-01-28

## 2019-01-27 RX ORDER — DEXAMETHASONE 0.5 MG/5ML
4 ELIXIR ORAL EVERY 6 HOURS
Qty: 0 | Refills: 0 | Status: COMPLETED | OUTPATIENT
Start: 2019-01-27 | End: 2019-01-28

## 2019-01-27 RX ADMIN — LIDOCAINE 1 PATCH: 4 CREAM TOPICAL at 06:59

## 2019-01-27 RX ADMIN — LIDOCAINE 1 PATCH: 4 CREAM TOPICAL at 06:57

## 2019-01-27 RX ADMIN — Medication 4 MILLIGRAM(S): at 18:02

## 2019-01-27 RX ADMIN — LIDOCAINE 1 PATCH: 4 CREAM TOPICAL at 17:57

## 2019-01-27 RX ADMIN — Medication 100 MILLIGRAM(S): at 21:16

## 2019-01-27 RX ADMIN — SENNA PLUS 2 TABLET(S): 8.6 TABLET ORAL at 21:16

## 2019-01-27 RX ADMIN — Medication 4 MILLIGRAM(S): at 12:09

## 2019-01-27 NOTE — PHYSICAL THERAPY INITIAL EVALUATION ADULT - IMPAIRMENTS FOUND, PT EVAL
aerobic capacity/endurance/gait, locomotion, and balance/gross motor/muscle strength/ROM/ergonomics and body mechanics

## 2019-01-27 NOTE — PROGRESS NOTE ADULT - REASON FOR ADMISSION
Intractable Lumbar back pain, inability to ambulate
Intractable Lumbar back pain, inability to ambulate

## 2019-01-27 NOTE — PROGRESS NOTE ADULT - SUBJECTIVE AND OBJECTIVE BOX
Patient seen and examined, doing slightly better this morning although pain still severe. No new complaints, denies parasthesias/weakness/numbness/radiating pain down legs  MRI Pending today of Lumbar Spine  ESR Normal    PAST MEDICAL & SURGICAL HISTORY:  Prostate mass: benign non nodular prostatic hyperplasia without urinary tract symptoms  Spermatocele  Arthritis  History of facial surgery: excision tumor of lip and reconstruction  S/P tonsillectomy  History of ankle surgery: bilateral ankles, right 10/19/2017    PHYSICAL EXAM:  General; Awake and alert, Oriented x 3  Hips/Pelvis:   Difficulty with SLR RLE, Able to lift off bed but no further, Normal SLR Left leg. Negative log roll, heel strike. No pain on passive Int/Ext hip rotation.  Spine exam:  Neck: supple, NT, Full Painless baseline AROM  Back: Tenderness to Right paraspinal region, +Muscle hypertonicity  Spine:                       Motor exam:          Right Upper extremity: Delt 5/5, Biceps 5/5, Triceps 5/5, Wrist Ext 5/5, Wrist Flexion 5/5,  Strength 5/5         SILT C5-S1, No Clonus, Negative Adan, +RP, Compartments soft           Left Upper extremity: Delt 5/5, Biceps 5/5, Triceps 5/5, Wrist Ext 5/5, Wrist Flexion 5/5,  Strength 5/5         SILT C5-S1, No Clonus, Negative Adan, +RP, Compartments soft           Right Lower Extremity: Hip Flexion 5/5, Hip Extension 5/5, Knee Flexion 5/5, Knee Extension 5/5, Ankle Dorsiflexion 5/5,          Ankle Plantar Flexion 5/5, Extensor hallucis Longus 5/5         SILT L2-S1, No pain with SLR, Negative Clonus, Negative Babinski                  Left Lower Extremity: Hip Flexion 5/5, Hip Extension 5/5, Knee Flexion 5/5, Knee Extension 5/5, Ankle Dorsiflexion 5/5,          Ankle Plantar Flexion 5/5, Extensor hallucis Longus 5/5         SILT L2-S1, No pain with SLR, Negative Clonus, Negative Babinski

## 2019-01-27 NOTE — PHYSICAL THERAPY INITIAL EVALUATION ADULT - PLANNED THERAPY INTERVENTIONS, PT EVAL
lumbar stabilization/manual therapy techniques/ROM/stretching/strengthening/bed mobility training/gait training/transfer training/neuromuscular re-education/balance training

## 2019-01-27 NOTE — PROGRESS NOTE ADULT - ASSESSMENT
a/p 59 yo M w/ Right Lumbar intractable back pain and inability to ambulate    ·	Xray and CT no obvious deformities in lumbar spine  ·	MRI pending for today, Lumbar Spine  ·	Continue muscle relaxers Q8 hours  ·	Lidocaine patch for Right lumbar Region ordered  ·	Pain control  ·	WBAT, PT evaluation for today  ·	Dr. Mendez service made aware of patient readmission, unsure if any Removal of Spermocele post operative explanation for back pain  ·	CT negative for renal stones  ·	Bowel regimen as patient has been constipated  ·	ESR Normal, Afebrile, nothing to suggest infectious cause/Discitis at this time

## 2019-01-27 NOTE — PHYSICAL THERAPY INITIAL EVALUATION ADULT - PERTINENT HX OF CURRENT PROBLEM, REHAB EVAL
Increased back pain for a week. Came to ED 1/24 and sent home with pain meds and muscle relaxer. Came back to ED again for severe R LBP.

## 2019-01-27 NOTE — PHYSICAL THERAPY INITIAL EVALUATION ADULT - GAIT DEVIATIONS NOTED, PT EVAL
decreased velocity of limb motion/decreased stride length/increased time in double stance/decreased swing-to-stance ratio/decreased bradford/decreased step length/decreased weight-shifting ability

## 2019-01-27 NOTE — PROGRESS NOTE ADULT - SUBJECTIVE AND OBJECTIVE BOX
Chief Complaint: Back pain    History: Still with severe back pain. Denies any numbness or weakness.     OBJECTIVE:     Vital Signs Last 24 Hrs  T(C): 36.9 (27 Jan 2019 02:00), Max: 36.9 (26 Jan 2019 21:52)  T(F): 98.4 (27 Jan 2019 02:00), Max: 98.4 (26 Jan 2019 21:52)  HR: 66 (27 Jan 2019 02:00) (65 - 71)  BP: 104/60 (27 Jan 2019 02:00) (104/60 - 127/78)  BP(mean): --  RR: 18 (27 Jan 2019 02:00) (16 - 18)  SpO2: 94% (27 Jan 2019 02:00) (94% - 97%)    Physical Exam:         Awake and alert         HEENT - unremarkable         Neck - supple         Back: tender at LS region         Bilateral Upper Extremities:             Good Motor Strength             Sensation intact         Bilateral Lower Extremities             Good Motor Strength             Senation intact             I&O's Detail      LABS:                        14.0   10.90 )-----------( 169      ( 26 Jan 2019 22:33 )             42.0     01-26    141  |  108  |  16  ----------------------------<  160<H>  4.4   |  25  |  1.12    Ca    8.7      26 Jan 2019 22:33    TPro  6.8  /  Alb  3.2<L>  /  TBili  0.5  /  DBili  x   /  AST  17  /  ALT  26  /  AlkPhos  82  01-26      A/P :  58y Male with sever LBP  -    Continue Pain Management  -    MRI LS Spine  -    Continue present treatment

## 2019-01-28 ENCOUNTER — TRANSCRIPTION ENCOUNTER (OUTPATIENT)
Age: 59
End: 2019-01-28

## 2019-01-28 VITALS
HEART RATE: 67 BPM | DIASTOLIC BLOOD PRESSURE: 78 MMHG | OXYGEN SATURATION: 93 % | TEMPERATURE: 98 F | SYSTOLIC BLOOD PRESSURE: 118 MMHG | RESPIRATION RATE: 20 BRPM

## 2019-01-28 RX ADMIN — PANTOPRAZOLE SODIUM 40 MILLIGRAM(S): 20 TABLET, DELAYED RELEASE ORAL at 06:22

## 2019-01-28 RX ADMIN — Medication 100 MILLIGRAM(S): at 06:22

## 2019-01-28 RX ADMIN — Medication 4 MILLIGRAM(S): at 00:26

## 2019-01-28 RX ADMIN — LIDOCAINE 1 PATCH: 4 CREAM TOPICAL at 06:22

## 2019-01-28 RX ADMIN — LIDOCAINE 1 PATCH: 4 CREAM TOPICAL at 06:24

## 2019-01-28 RX ADMIN — Medication 4 MILLIGRAM(S): at 06:22

## 2019-01-28 NOTE — DISCHARGE NOTE ADULT - NS AS ACTIVITY OBS
No Heavy lifting/straining/Walking-Indoors allowed/Driving allowed/Showering allowed/Stairs allowed/Return to Work/School allowed

## 2019-01-28 NOTE — DISCHARGE NOTE ADULT - HOSPITAL COURSE
Patient admitted with acute onset of R sided LBP  Started on analgesia and steroids with marked improvement  Today patient comfortable, ambulating and transfering independently  Neuro without deficits

## 2019-01-28 NOTE — DISCHARGE NOTE ADULT - MEDICATION SUMMARY - MEDICATIONS TO TAKE
I will START or STAY ON the medications listed below when I get home from the hospital:    oxyCODONE 10 mg oral tablet  -- 1 tab(s) by mouth once, As needed, Severe Pain (7 - 10)  -- Indication: For ACUTE RIGHT SIDED LOW BACK PAIN     mg oral tablet  -- 1 tab(s) by mouth 3 times a day   -- Do not take this drug if you are pregnant.  It is very important that you take or use this exactly as directed.  Do not skip doses or discontinue unless directed by your doctor.  May cause drowsiness or dizziness.  Obtain medical advice before taking any non-prescription drugs as some may affect the action of this medication.  Take with food or milk.    -- Indication: For ACUTE RIGHT SIDED LOW BACK PAIN    Robaxin 500 mg oral tablet  -- 1 tab(s) by mouth 2 times a day   -- May cause drowsiness.  Alcohol may intensify this effect.  Use care when operating dangerous machinery.    -- Indication: For ACUTE RIGHT SIDED LOW BACK PAIN

## 2019-01-28 NOTE — DISCHARGE NOTE ADULT - CARE PROVIDER_API CALL
Solomon Soria), Orthopaedic Surgery  [address] [place] [address]  Phone: [phone]  Fax: [phone]

## 2019-01-28 NOTE — DISCHARGE NOTE ADULT - PATIENT PORTAL LINK FT
You can access the [email or web address], offered by [facility], by registering with the following website: [email or web address]

## 2019-02-01 DIAGNOSIS — M54.5 LOW BACK PAIN: ICD-10-CM

## 2019-02-01 DIAGNOSIS — Z88.6 ALLERGY STATUS TO ANALGESIC AGENT: ICD-10-CM

## 2019-02-01 DIAGNOSIS — N40.0 BENIGN PROSTATIC HYPERPLASIA WITHOUT LOWER URINARY TRACT SYMPTOMS: ICD-10-CM

## 2019-02-01 DIAGNOSIS — M19.90 UNSPECIFIED OSTEOARTHRITIS, UNSPECIFIED SITE: ICD-10-CM

## 2020-10-15 NOTE — PROVIDER CONTACT NOTE (OTHER) - SITUATION
Electrophysiology Consult Note:     CHIEF COMPLAINT:  Patient is a 86y old  Female who presents with a chief complaint of Syncope (15 Oct 2020 14:20)        HISTORY OF PRESENT ILLNESS:   HPI:  85 y/o female w/ PMHx HTN, HLD, CAD (RIAN x3 in 2015 as per MD note in [employer]), Hypothyroidism, and IBS who was BIBEMS to [facility] ED following a near syncopal episode. Patient reports she began to feel a "severe vertigo sensation" and felt like the whole world was spinning, associated w/ SOB. Patient states this lasted about 30 minutes, until the EMS personnel arrived to bring her to the hospital. Patient states she has experienced this sensation before, most recently in 06/2020 when she fell and hit her head on the sidewalk. Patient reports she was admitted to [facility] at that time and was discharged after two days. Patient denies any chest pain, diaphoresis, abdominal pain, nausea/vomiting, melena, and LE edema. Patient also denies any recent fever, chills, cough, or known sick contacts. As per ED signout, EMS personnel reported the patient was in rapid A-fib when they arrived to the scene and she was given IV Cardizem and IV Magnesium w/ subsequent improvement in her rate and rhythm and patient returned to sinus rhythm.           PAST MEDICAL & SURGICAL HISTORY:  IBS (irritable bowel syndrome)    Hyperlipidemia  Hyperlipidemia    Essential hypertension  HTN (hypertension)    Hypothyroidism  Hypothyroidism    History of partial thyroidectomy    H/O cardiac disorder  stents x2 secondary to heart attack third stents was inserted.    Other postprocedural status  Left and right shoulder x 2    Status post cataract extraction  S/P cataract surgery  bilateral        FAMILY HISTORY:  FHx: myocardial infarction  Mother    FHx: coronary artery disease  Father        SOCIAL HISTORY:    [x ] Non-smoker      Allergies    Demerol HCl (Vomiting)  penicillins (Rash)    Intolerances    	    MEDICATIONS:  MEDICATIONS  (STANDING):  amLODIPine   Tablet 5 milliGRAM(s) Oral daily  aspirin enteric coated 81 milliGRAM(s) Oral daily  atorvastatin 20 milliGRAM(s) Oral at bedtime  enoxaparin Injectable 40 milliGRAM(s) SubCutaneous every 24 hours  folic acid 1 milliGRAM(s) Oral daily  levothyroxine 75 MICROGram(s) Oral daily  metoprolol succinate ER 25 milliGRAM(s) Oral daily  senna 2 Tablet(s) Oral at bedtime    MEDICATIONS  (PRN):        REVIEW OF SYSTEMS:  CONSTITUTIONAL: No fever, weight loss, or fatigue  EYES: No eye pain, visual disturbances, or discharge  ENMT:  No difficulty hearing, tinnitus, vertigo; No sinus or throat pain  NECK: No pain or stiffness  RESPIRATORY: No cough, wheezing, chills or hemoptysis; No Shortness of Breath  CARDIOVASCULAR: No chest pain, palpitations, + dizziness, or leg swelling  GASTROINTESTINAL: No abdominal or epigastric pain. No nausea, vomiting, or hematemesis; No diarrhea or constipation  GENITOURINARY: No dysuria, frequency, hematuria, or incontinence  NEUROLOGICAL: No headaches, memory loss, loss of strength, numbness, or tremors  SKIN: No itching, burning, rashes, or lesions   LYMPH Nodes: No enlarged glands  ENDOCRINE: No heat or cold intolerance; No hair loss      PHYSICAL EXAM:  Vital Signs Last 24 Hrs  T(C): 36.3 (15 Oct 2020 14:27), Max: 36.4 (14 Oct 2020 22:30)  T(F): 97.4 (15 Oct 2020 14:27), Max: 97.5 (14 Oct 2020 22:30)  HR: 63 (15 Oct 2020 12:05) (54 - 81)  BP: 161/70 (15 Oct 2020 12:05) (125/60 - 161/70)  BP(mean): --  RR: 18 (15 Oct 2020 12:05) (16 - 20)  SpO2: 99% (15 Oct 2020 12:05) (99% - 100%)  Daily     Daily     Constitutional: NAD	  HEENT:   PERRL, EOMI	  CVS: S1 S2, No edema  Pulm: Lungs clear to auscultation	  GI:  Soft, Non-tender, + BS	  Ext: No LE edema  Neurologic: A&O x 3  Skin: No rash or lesion       	  LABS:	                         13.9   5.34  )-----------( 273      ( 15 Oct 2020 05:59 )             43.7     10-15    142  |  105  |  14  ----------------------------<  111<H>  4.8   |  26  |  1.00    Ca    9.5      15 Oct 2020 05:59  Mg     2.2     10-15    TPro  7.5  /  Alb  4.2  /  TBili  0.3  /  DBili  x   /  AST  20  /  ALT  14  /  AlkPhos  74  10-13    proBNP:   Lipid Profile:   HgA1c:   TSH: 	      EKG: < from: 12 Lead ECG (10.13.20 @ 15:39) >  Ventricular Rate 87 BPM    Atrial Rate 87 BPM    P-R Interval 138 ms    QRS Duration 78 ms    Q-T Interval 378 ms    QTC Calculation(Bazett) 454 ms    P Axis 67 degrees    R Axis -16 degrees    T Axis 63 degrees      Telemetry: sinus rhythm      - ECHO 6/5/2020 revealed EF 75-80%, mild MR and TR, PASP 37mmHg, no pericardial effusion     OFFICE IS AWARE OF CONSULT

## 2021-01-14 NOTE — ED ADULT NURSE NOTE - CAS EDN DISCHARGE INTERVENTIONS
Mammogram after 3-  Please schedule mammogram, you may call their contact number at [phone]. They are located at [address] [place] [address], [place] [address]    Colonoscopy:  You have been asked to see gastroenterology for a procedure or a consultation  Gastroenterology:   [facility] [phone]   [facility] [phone]   [place] [phone]   [facility] [phone]    Xray knee to determine next course of action      Medicare Wellness Visit  Plan for Preventive Care    A good way for you to stay healthy is to use preventive care.  Medicare covers many services that can help you stay healthy.* The goal of these services is to find any health problems as quickly as possible. Finding problems early can help make them easier to treat.  Your personal plan below lists the services you may need and when they are due.     Health Maintenance Summary     Shingles Vaccine (2 of 3)  Overdue since 1/9/2017    Colorectal Cancer Screen-   Overdue since 3/19/2020    Medicare Wellness Visit (Yearly)  Overdue since 3/25/2020    Depression Screening (Yearly)  Next due on 1/14/2022    Breast Cancer Screening (Every 2 Years)  Next due on 3/20/2022    DTaP/Tdap/Td Vaccine (2 - Td)  Next due on 9/18/2022    Hepatitis C Screening   Completed    Pneumococcal Vaccine 65+   Completed    Osteoporosis Screening   Completed    Influenza Vaccine   Completed    Meningococcal Vaccine   Aged Out    HPV Vaccine   Aged Out           Preventive Care for Women and Men    Heart Screenings (Cardiovascular):  · Blood tests are used to check your cholesterol, lipid and triglyceride levels. High levels can increase your risk for heart disease and stroke. High levels can be treated with medications, diet and exercise. Lowering your levels can help keep your heart and blood vessels healthy.  Your provider will order these tests if they are needed.    ·   Colorectal Screening:  · There are many tests that are used to check  for cancer of your colon and rectum. You and your provider should discuss what test is best for you and when to have it done.  Options include:  · Screening Colonoscopy: exam of the entire colon, seen through a flexible lighted tube.  · Flexible Sigmoidoscopy: exam of the last third (sigmoid portion) of the colon and rectum, seen through a flexible lighted tube.  · Cologuard DNA stool test: a sample of your stool is used to screen for cancer and unseen blood in your stool.  · Fecal Occult Blood Test: a sample of your stool is studied to find any unseen blood    Flu Shot:  · An immunization that helps to prevent influenza (the flu). You should get this every year. The best time to get the shot is in the fall.     Diabetes Screening:  · A test to measure sugar (glucose) in your blood is called a fasting blood sugar. Fasting means you cannot have food or drink for at least 8 hours before the test. This test can detect diabetes long before you may notice symptoms.    Glaucoma Screening:  · Glaucoma screening is performed by your eye doctor. The test measures the fluid pressure inside your eyes to determine if you have glaucoma.     Preventive Screening tests for Women    Screening Mammograms and Breast Exams:  · An x-ray of your breasts to check for breast cancer before you or your doctor may be able to feel it.  If breast cancer is found early it can usually be treated with success.      Bone Mass Measurements:  · A painless x-ray of your bone density to see if you are at risk for a broken bone. Bone density refers to the thickness of bones or how tightly the bone tissue is packed.        *Medicare pays for many preventive services to keep you healthy. For some of these services, you might have to pay a deductible, coinsurance, and / or copayment.  The amounts vary depending on the type of services you need and the kind of Medicare health plan you have.               IV intact

## 2022-11-28 NOTE — ASU DISCHARGE PLAN (ADULT/PEDIATRIC). - ACTIVITY LEVEL
Speech Therapy    Visit Type: Treatment  Nursing comments: RN consent to treat patient.  Patient will receive MRI today  Present at bedside: Mother  Subjective reported by: Mother    SUBJECTIVE   Patient alert and awake.  Patient reports has a good appetite and continues to use nosey cup for liquid intake.   Patient / Family Goals: maximize function    OBJECTIVE    Diet:  Oral: full oral feeding   - General pediatric diet   - Swallow Precautions:  Sit upright for all PO intake.  Follow 3 strikes precautions and stop eating with 3 s/s of aspiration.   Environment and Equipment:  Room air         Communication/Cognition:  Arousal and Alertness:     - appropriate responses to stimuli  Expression: Verbal:     - Patient participated in word fluency exercises by identifying a word given three descriptions.  Patient completed with appropriate response timing with 75% accuracy.  Patient presents with appropriate thought organization techniques and categorization skills throughout the exercise.    Cognition:     - Orientation: person and place    - Attention: age appropriate            ASSESSMENT                                                                        • Impairments: problem solving/executive function  • Functional Limitations: communication/cognition  • Skilled therapy is required to establish safe means of nutrition, hydration and medication administration  • Requires SLP Follow Up: Yes    Discharge Recommendations  Patient continues to tolerate pediatric general diet with no overt s/s of aspiration with use of safe swallow compensatory strategies and with use of nosey cup for liquid intake.  Continue cognitive-linguistic tasks to increase executive functioning, word fluency, orientation, and delayed memory skills.   SLP Referrals/Discharge Recommendations: Other (comments)    • Rehab Potential: good  • Progress: Progressing toward goals      Education:   Learning Assessment:  - Primary learner: mother  - Are  they ready to learn: yes  - Preferred learning style: verbal  - Barriers to learning: no barriers apparent at this time  Education provided during session:  - Receiving Education: patient's parent(s)  - aspiration precautions and cognition  - Results of above outlined education: Verbalizes understanding  Patient at end of session:    - location: in bed    - hand off to: nurse and family/caregiver    PLAN     Frequency: 4-5x    Interventions:  Communication/cognition therapy   Plan/Goal Agreement: Parent/caregiver agrees with goals and treatment plan    RECOMMENDATIONS  Diet: general diet, thin liquids  Communication / Cognition:   - low stimulation environment and provide short simple directions    GOALS    Short Term Goals:   1) Patient will continue to tolerate low stimulation for cognitive - linguistic and communication.    2) Patient to participate in VFSS when patient medically stable and with increased levels of alertness.  3.) Patient to participate in safe po trials to increase swallow safety and function when appropriate:  (Met)  4.) Patient will participate in cognitive-linguistic assessment. MET    Long Term Goals:   1. Patient to accept least restrictive diet without distress  2. Patient  to attend to/participate in age appropriate cognitive tasks  3. Patient to attend to/ participate in expressive and receptive language activities  Documented in the chart in the following areas: Assessment.      Therapy procedure time and total treatment time can be found documented on the Time Entry flowsheet.   no exercise

## 2022-11-29 ENCOUNTER — EMERGENCY (EMERGENCY)
Facility: HOSPITAL | Age: 62
LOS: 0 days | Discharge: HOME | End: 2022-11-29
Attending: EMERGENCY MEDICINE
Payer: COMMERCIAL

## 2022-11-29 VITALS
RESPIRATION RATE: 18 BRPM | OXYGEN SATURATION: 98 % | SYSTOLIC BLOOD PRESSURE: 116 MMHG | TEMPERATURE: 98 F | HEART RATE: 54 BPM | DIASTOLIC BLOOD PRESSURE: 73 MMHG

## 2022-11-29 VITALS
RESPIRATION RATE: 16 BRPM | OXYGEN SATURATION: 97 % | HEART RATE: 59 BPM | SYSTOLIC BLOOD PRESSURE: 116 MMHG | TEMPERATURE: 98 F | DIASTOLIC BLOOD PRESSURE: 78 MMHG
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
## 2022-11-29 DIAGNOSIS — Z90.89 ACQUIRED ABSENCE OF OTHER ORGANS: Chronic | ICD-10-CM

## 2022-11-29 DIAGNOSIS — M79.89 OTHER SPECIFIED SOFT TISSUE DISORDERS: ICD-10-CM

## 2022-11-29 DIAGNOSIS — S60.455A SUPERFICIAL FOREIGN BODY OF LEFT RING FINGER, INITIAL ENCOUNTER: ICD-10-CM

## 2022-11-29 DIAGNOSIS — L98.0 PYOGENIC GRANULOMA: ICD-10-CM

## 2022-11-29 DIAGNOSIS — Z98.890 OTHER SPECIFIED POSTPROCEDURAL STATES: Chronic | ICD-10-CM

## 2022-11-29 DIAGNOSIS — L92.9 GRANULOMATOUS DISORDER OF THE SKIN AND SUBCUTANEOUS TISSUE, UNSPECIFIED: ICD-10-CM
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
## 2022-11-29 DIAGNOSIS — Z88.5 ALLERGY STATUS TO NARCOTIC AGENT: ICD-10-CM

## 2022-11-29 PROCEDURE — 99283 EMERGENCY DEPT VISIT LOW MDM: CPT

## 2022-11-29 PROCEDURE — 73140 X-RAY EXAM OF FINGER(S): CPT | Mod: 26,LT

## 2022-11-29 PROCEDURE — 99283 EMERGENCY DEPT VISIT LOW MDM: CPT | Mod: 25

## 2022-11-29 PROCEDURE — 73140 X-RAY EXAM OF FINGER(S): CPT | Mod: LT

## 2022-11-29 RX ORDER — CEPHALEXIN 500 MG
1 CAPSULE ORAL
Qty: 21 | Refills: 0
Start: 2022-11-29 | End: 2022-12-05

## 2022-11-29 NOTE — ED STATDOCS - PROGRESS NOTE DETAILS
spoke to Dr. Richard and recommend silver nitrate to area of granuloma, antibiotics and will see in the office. pt well appearing on dc and agrees with plan. -Kirstie Quiñonez PA-C

## 2022-11-29 NOTE — ED STATDOCS - CLINICAL SUMMARY MEDICAL DECISION MAKING FREE TEXT BOX
pt present to the ED w/ splinter on 4th digit now w/ bleeding and granulation tissue. Plan XR and hand consult.

## 2022-11-29 NOTE — ED STATDOCS - ATTENDING APP SHARED VISIT CONTRIBUTION OF CARE
I, Rod Koroma MD,  performed the initial face to face bedside interview with this patient regarding history of present illness, review of symptoms and relevant past medical, social and family history.  I completed an independent physical examination.  I was the initial provider who evaluated this patient.   I personally saw the patient and performed a substantive portion of the visit including all aspects of the medical decision making.  I have signed out the follow up of any pending tests (i.e. labs, radiological studies) to the MIGUEL.  I have communicated the patient’s plan of care and disposition with the MIGUEL.  The history, relevant review of systems, past medical and surgical history, medical decision making, and physical examination was documented by the scribe in my presence and I attest to the accuracy of the documentation.

## 2022-11-29 NOTE — ED STATDOCS - PATIENT PORTAL LINK FT
You can access the FollowMyHealth Patient Portal offered by [facility] by registering at the following website: [email or web address]. By joining [facility]’s FollowMyHealth portal, you will also be able to view your health information using other applications (apps) compatible with our system.

## 2022-11-29 NOTE — ED STATDOCS - OBJECTIVE STATEMENT
61 y/o male w/ no pertinent PMHx presents to the ED for possible infection to left 4th finger. Pt reports he had a blister in that location x2 weeks ago, states redness, pain, and swelling has persisted. Pt thinks splinter came out. Pt still w/ active bleeding. Pt last tetanus x5 years ago. No other complaints at this time. Pt right handed.

## 2022-11-29 NOTE — ED ADULT TRIAGE NOTE - CHIEF COMPLAINT QUOTE
Pt arrives to ED complaining of splinter in left fourth finger now developing redness, swelling. denies medical history.

## 2022-11-29 NOTE — ED STATDOCS - NSFOLLOWUPINSTRUCTIONS_ED_ALL_ED_FT
Pyogenic Granuloma    Close-up of a pyogenic granuloma on a person's cheek.   Pyogenic granuloma is a growth (lesion) that forms on the skin or on the mucous membranes of the mouth. This type of lesion is a lump of very red tissue that bleeds easily, is usually a single lesion, and most often affects:  •The head and neck.      •The mucous membranes of the mouth or tongue.      •The upper body.      •The hands and feet.      A pyogenic granuloma usually measures about 0.2 inches (0.5 cm), but lesions can be smaller or larger. This condition does not spread from person to person (is not contagious). The lesion is noncancerous (benign).      What are the causes?    The cause of pyogenic granulomas is unknown, but the lesions commonly occur after a minor injury, such as pricking your skin or biting your lip or tongue. A mound of tiny blood vessels (capillaries) forms to create a lesion. Sometimes the lesions occur without an injury.      What increases the risk?    You are more likely to develop this condition if:  •You are pregnant.      •You are a child or young adult.    •You take certain medicines, including:  •Medicines for acne.      •Birth control pills.      •Some medicines used to treat cancer, HIV, or AIDS.          What are the signs or symptoms?    The main symptom of this condition is a raised or lumpy lesion that is very red. Your lesion may also:  •Have a crusty, broken, and irritated (ulcerated) surface.      •Bleed easily.      •Be slightly sore.        How is this diagnosed?    This condition is diagnosed based on your symptoms and medical history, especially if you recently had an injury. You may also have:  •A physical exam.       •A small piece of your granuloma removed for testing (biopsy) to rule out cancer.        How is this treated?    A small lesion may go away without treatment. You may have to stop or change any medicines that caused your lesion. Pyogenic granulomas caused by pregnancy usually go away after delivery.    This condition may also be treated by removing the lesion. This may be done if the lesion is large, irritated, or bleeds easily. Removal may involve:  •Curettage. This scrapes away the lesion.      •Using chemicals or electric energy to destroy the lesion.      •Surgical excision. This removes the lesion along with a small piece of normal skin or mucous membrane. This is the best treatment to prevent the lesion from coming back.        Follow these instructions at home:    •Take over-the-counter and prescription medicines only as told by your health care provider.      • Do not scratch or pick at your lesion. Cover your lesion area with a bandage (dressing) or gauze to avoid having anything rub against your lesion.      •Keep your lesion clean to avoid infection.      •Keep all follow-up visits. This is important.        Contact a health care provider if:    •You have a fever.      •Your lesion bleeds.      •Your lesion comes back after treatment.      •You have a lesion that grows rapidly or hardens.        Summary    •Pyogenic granuloma is a growth (lesion) that forms on the skin or on the mucous membranes of the mouth. This condition does not spread from person to person (is not contagious).      •A small lesion may go away without treatment. If your lesion is large, irritated, or bleeds easily, you may need to have it removed.      • Do not scratch or pick at your lesion. Cover your lesion area with a bandage (dressing) or gauze to avoid having anything rub against your lesion.      •Keep your lesion clean to avoid infection.      This information is not intended to replace advice given to you by your health care provider. Make sure you discuss any questions you have with your health care provider.

## 2022-11-29 NOTE — ED STATDOCS - CARE PROVIDER_API CALL
Jocy Richard)  Orthopaedic Surgery; Surgery of the Hand  [address] [place] [address]  Phone: [phone]  Fax: [phone]  Follow Up Time: Urgent

## 2022-11-29 NOTE — ED STATDOCS - NEUROLOGICAL, MLM
Writer left message for patient to call office regarding non urgent results. Number given.     sensation is normal and strength is normal.

## 2023-03-09 NOTE — PATIENT PROFILE ADULT - NSALCOHOLTYPE_GEN_A_NUR
Called Rafaela as requested, states patient is in the hospital with bronchitis due to be discharged soon.  Rubio states patient has been coughing quite a bit with the bronchitis and asking if that will interfere with the Holter and blood pressure monitor, advised it would probably interfere especially with the event monitor.  Rafaela advised we should push this appointment appointments out a couple of weeks until the coughing has subsided.  We will message scheduling to call patient to reschedule appointments.  Randy HALEY    beer

## 2023-06-14 ENCOUNTER — OUTPATIENT (OUTPATIENT)
Dept: OUTPATIENT SERVICES | Facility: HOSPITAL | Age: 63
LOS: 1 days | Discharge: HOME | End: 2023-06-14

## 2023-06-14 DIAGNOSIS — Z98.890 OTHER SPECIFIED POSTPROCEDURAL STATES: Chronic | ICD-10-CM

## 2023-06-14 DIAGNOSIS — Z90.89 ACQUIRED ABSENCE OF OTHER ORGANS: Chronic | ICD-10-CM

## 2023-06-14 DIAGNOSIS — R79.89 OTHER SPECIFIED ABNORMAL FINDINGS OF BLOOD CHEMISTRY: ICD-10-CM

## 2023-06-15 ENCOUNTER — APPOINTMENT (OUTPATIENT)
Dept: HEMATOLOGY ONCOLOGY | Facility: CLINIC | Age: 63
End: 2023-06-15

## 2023-09-05 NOTE — PHYSICAL THERAPY INITIAL EVALUATION ADULT - SIT-TO-STAND BALANCE
fair minus/worse when in pain Detail Level: Generalized Render In Strict Bullet Format?: No Continue Regimen: Ketoconazole cream and fluocinonide cream Initiate Treatment: Fluocinonide cream

## 2024-05-14 ENCOUNTER — OUTPATIENT (OUTPATIENT)
Dept: OUTPATIENT SERVICES | Facility: HOSPITAL | Age: 64
LOS: 1 days | Discharge: HOME | End: 2024-05-14

## 2024-05-14 DIAGNOSIS — Z98.890 OTHER SPECIFIED POSTPROCEDURAL STATES: Chronic | ICD-10-CM

## 2024-05-14 DIAGNOSIS — Z90.89 ACQUIRED ABSENCE OF OTHER ORGANS: Chronic | ICD-10-CM

## 2024-05-14 DIAGNOSIS — R79.89 OTHER SPECIFIED ABNORMAL FINDINGS OF BLOOD CHEMISTRY: ICD-10-CM

## 2024-05-15 ENCOUNTER — APPOINTMENT (OUTPATIENT)
Dept: HEMATOLOGY ONCOLOGY | Facility: CLINIC | Age: 64
End: 2024-05-15
Payer: COMMERCIAL

## 2024-05-15 ENCOUNTER — LABORATORY RESULT (OUTPATIENT)
Age: 64
End: 2024-05-15

## 2024-05-15 VITALS
SYSTOLIC BLOOD PRESSURE: 110 MMHG | HEIGHT: 72 IN | OXYGEN SATURATION: 94 % | HEART RATE: 61 BPM | TEMPERATURE: 97.9 F | WEIGHT: 205 LBS | DIASTOLIC BLOOD PRESSURE: 74 MMHG | BODY MASS INDEX: 27.77 KG/M2

## 2024-05-15 DIAGNOSIS — M19.071 PRIMARY OSTEOARTHRITIS, RIGHT ANKLE AND FOOT: ICD-10-CM

## 2024-05-15 DIAGNOSIS — R79.89 OTHER SPECIFIED ABNORMAL FINDINGS OF BLOOD CHEMISTRY: ICD-10-CM

## 2024-05-15 DIAGNOSIS — R73.03 PREDIABETES.: ICD-10-CM

## 2024-05-15 DIAGNOSIS — M19.072 PRIMARY OSTEOARTHRITIS, RIGHT ANKLE AND FOOT: ICD-10-CM

## 2024-05-15 DIAGNOSIS — K57.90 DIVERTICULOSIS OF INTESTINE, PART UNSPECIFIED, W/OUT PERFORATION OR ABSCESS W/OUT BLEEDING: ICD-10-CM

## 2024-05-15 DIAGNOSIS — Z78.9 OTHER SPECIFIED HEALTH STATUS: ICD-10-CM

## 2024-05-15 DIAGNOSIS — I25.10 ATHEROSCLEROTIC HEART DISEASE OF NATIVE CORONARY ARTERY W/OUT ANGINA PECTORIS: ICD-10-CM

## 2024-05-15 PROCEDURE — G2211 COMPLEX E/M VISIT ADD ON: CPT | Mod: NC,1L

## 2024-05-15 PROCEDURE — 99204 OFFICE O/P NEW MOD 45 MIN: CPT

## 2024-05-15 NOTE — REVIEW OF SYSTEMS
[Patient Intake Form Reviewed] : Patient intake form was reviewed [Joint Pain] : joint pain [Joint Stiffness] : joint stiffness [Negative] : Allergic/Immunologic [FreeTextEntry9] : chronic ankle pains

## 2024-05-15 NOTE — HISTORY OF PRESENT ILLNESS
[de-identified] : INÉS FRANK is a 63 y.o. M with a PMH significant for prediabetes, multiple b/l ankle surgeries -> OA, who has been referred to our office for an evaluation of elevated iron studies.   5/13/23 - Ferritin: 2017, TSAT: 92%, CRP: 3.9, ESR: 12, AST: 94, ALT: 115 4/9/24 - Ferritin: 930, TSAT: 81%, AST: 40, ALT: 61  Patient was referred to hematology in 5/2013 but did not follow-up.  Now this year his wife came with him to annual PE so he has been more compliant. PCP ordered CT A/P as patient also reported some abdominal pains.  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  Has been referred to nephrology (can't recall who), cardiology (Dr. Arun Bruno), and GI (Dr. Sabrina Allan).   Patient is of [place] descent.  He denies any family hx of hemochromatosis, iron overload, or hepatic issues.  Has not donated any blood.  Does not recall having any infectious episodes last year but reports he did have a significant flare in his ankle arthritis early last year. Will likely need B/L ankle replacements.

## 2024-05-15 NOTE — ASSESSMENT
[FreeTextEntry1] : Patient is a 63 y.o. with elevated iron studies x 2 years.  Etiology may be multifactorial. Since patient initially presented with elevated LFT's his initial Ferritin elevation may have been an acute phase reaction associated with inflammation and liver disease.  A decrease in the inflammation can explain the decrease in the Ferritin levels, even though the patient did not donate any blood in this interval. His LFT's also improved. The elevated TSAT is concerning, however, for primary hemochromatosis.  Also, patient is of [place] descent.  Plan:  1. HFE testing sent.  2. Will order MRI liver to quantitate iron overload.  Once we get results will review with patient.  All questions answered.  Dr. Aminta Escudero (PCP) Dr. Arun Bruno (Cardio) Dr. Sabrina Allan (GI) Dr. Issa Crisostomo (Derm) Nephrology

## 2024-05-15 NOTE — REASON FOR VISIT
[Initial Consultation] : an initial consultation for [Spouse] : spouse [FreeTextEntry2] : Elevated Ferritin

## 2024-05-16 DIAGNOSIS — Z78.9 OTHER SPECIFIED HEALTH STATUS: ICD-10-CM

## 2024-05-30 ENCOUNTER — APPOINTMENT (OUTPATIENT)
Dept: NEPHROLOGY | Facility: CLINIC | Age: 64
End: 2024-05-30

## 2024-06-05 ENCOUNTER — APPOINTMENT (OUTPATIENT)
Dept: UROLOGY | Facility: CLINIC | Age: 64
End: 2024-06-05
Payer: COMMERCIAL

## 2024-06-05 VITALS
OXYGEN SATURATION: 95 % | WEIGHT: 205 LBS | BODY MASS INDEX: 27.77 KG/M2 | SYSTOLIC BLOOD PRESSURE: 117 MMHG | HEIGHT: 72 IN | RESPIRATION RATE: 16 BRPM | HEART RATE: 63 BPM | DIASTOLIC BLOOD PRESSURE: 80 MMHG

## 2024-06-05 DIAGNOSIS — N40.1 BENIGN PROSTATIC HYPERPLASIA WITH LOWER URINARY TRACT SYMPMS: ICD-10-CM

## 2024-06-05 DIAGNOSIS — I72.2 ANEURYSM OF RENAL ARTERY: ICD-10-CM

## 2024-06-05 DIAGNOSIS — N20.0 CALCULUS OF KIDNEY: ICD-10-CM

## 2024-06-05 DIAGNOSIS — N13.8 BENIGN PROSTATIC HYPERPLASIA WITH LOWER URINARY TRACT SYMPMS: ICD-10-CM

## 2024-06-05 DIAGNOSIS — N52.9 MALE ERECTILE DYSFUNCTION, UNSPECIFIED: ICD-10-CM

## 2024-06-05 DIAGNOSIS — Z80.3 FAMILY HISTORY OF MALIGNANT NEOPLASM OF BREAST: ICD-10-CM

## 2024-06-05 DIAGNOSIS — Z80.42 FAMILY HISTORY OF MALIGNANT NEOPLASM OF PROSTATE: ICD-10-CM

## 2024-06-05 PROCEDURE — 99204 OFFICE O/P NEW MOD 45 MIN: CPT

## 2024-06-05 RX ORDER — TADALAFIL 5 MG/1
5 TABLET ORAL
Qty: 30 | Refills: 11 | Status: ACTIVE | COMMUNITY
Start: 2024-06-05 | End: 1900-01-01

## 2024-06-05 RX ORDER — TAMSULOSIN HYDROCHLORIDE 0.4 MG/1
0.4 CAPSULE ORAL
Qty: 30 | Refills: 11 | Status: ACTIVE | COMMUNITY
Start: 2024-06-05 | End: 1900-01-01

## 2024-06-05 NOTE — HISTORY OF PRESENT ILLNESS
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 He reports he has no abdominal or flank pain and denies hematuria. He denies having hypertension. Patient notes some increased urinary frequency over the years and nocturia. He also notes weaker erections and difficulty maintaining his erections. He has no other complaints. He states he had his PSA and rectal exam with his primary care physician recently and they were noted to be normal.

## 2024-06-05 NOTE — ASSESSMENT
[FreeTextEntry1] : Urine studies and Testosterone are obtained.  Discussed with patient his Renal artery aneurysm is at a size where it can be observed given that he is asymptomatic. He will repeat a renal sonogram in a year. For his frequency and lower tract symptoms due to prostatism he will be placed on Tamsulosin and return to the office for a transrectal prostate ultrasound. For Erectile dysfunction patient's testosterone levels will be checked. He will be placed on Cialis and return to the office for a penile duplex study.

## 2024-06-05 NOTE — REVIEW OF SYSTEMS
[Negative] : Heme/Lymph [Poor quality erections] : Poor quality erections [No erections] : no erections [History of kidney stones] : history of kidney stones [Wake up at night to urinate  How many times?  ___] : wakes up to urinate [unfilled] times during the night [Slow urine stream] : slow urine stream [Interrupted urine stream] : interrupted urine stream [Joint Pain] : joint pain [Joint Swelling] : joint swelling [Muscle Weakness] : muscle weakness [Feelings Of Weakness] : feelings of weakness

## 2024-06-06 LAB
PSA SERPL-MCNC: 0.51 NG/ML
TESTOST SERPL-MCNC: 541 NG/DL

## 2024-06-07 ENCOUNTER — APPOINTMENT (OUTPATIENT)
Dept: HEMATOLOGY ONCOLOGY | Facility: CLINIC | Age: 64
End: 2024-06-07

## 2024-06-07 LAB
APPEARANCE: CLEAR
BACTERIA UR CULT: NORMAL
BACTERIA: NEGATIVE /HPF
BILIRUBIN URINE: NEGATIVE
BLOOD URINE: NEGATIVE
CAST: 0 /LPF
COLOR: YELLOW
EPITHELIAL CELLS: 0 /HPF
GLUCOSE QUALITATIVE U: NEGATIVE MG/DL
KETONES URINE: NEGATIVE MG/DL
LEUKOCYTE ESTERASE URINE: NEGATIVE
MICROSCOPIC-UA: NORMAL
NITRITE URINE: NEGATIVE
PH URINE: 6.5
PROTEIN URINE: NEGATIVE MG/DL
RED BLOOD CELLS URINE: 0 /HPF
SPECIFIC GRAVITY URINE: 1.01
URINE CYTOLOGY: NORMAL
UROBILINOGEN URINE: 0.2 MG/DL
WHITE BLOOD CELLS URINE: 0 /HPF

## 2024-06-07 PROCEDURE — 99443: CPT

## 2024-06-11 ENCOUNTER — NON-APPOINTMENT (OUTPATIENT)
Age: 64
End: 2024-06-11

## 2024-06-12 ENCOUNTER — NON-APPOINTMENT (OUTPATIENT)
Age: 64
End: 2024-06-12

## 2024-06-20 NOTE — ASSESSMENT
[FreeTextEntry1] : Patient is a 63 y.o. with elevated iron studies x 2 years.  Etiology may be multifactorial. Since patient initially presented with elevated LFT's his initial Ferritin elevation may have been an acute phase reaction associated with inflammation and liver disease.  A decrease in the inflammation can explain the decrease in the Ferritin levels, even though the patient did not donate any blood in this interval. His LFT's also improved. The elevated TSAT is concerning, however, for primary hemochromatosis.  Also, patient is of [place] descent.  Plan:  1. HFE testing sent.  2. Will order MRI liver to quantitate iron overload.  Once we get results will review with patient.  All questions answered.  Dr. Aminta Escudero (PCP) Dr. Arun Bruno (Cardio) Dr. Sabrina Allan (GI) Dr. Issa Crisostomo (Derm) Nephrology

## 2024-06-20 NOTE — HISTORY OF PRESENT ILLNESS
[de-identified] : INÉS FRANK is a 63 y.o. M with a PMH significant for prediabetes, multiple b/l ankle surgeries -> OA, who has been referred to our office for an evaluation of elevated iron studies.   5/13/23 - Ferritin: 2017, TSAT: 92%, CRP: 3.9, ESR: 12, AST: 94, ALT: 115 4/9/24 - Ferritin: 930, TSAT: 81%, AST: 40, ALT: 61  Patient was referred to hematology in 5/2013 but did not follow-up.   5/15/24 - Initially seen in our office, accompanied by his wife (she is making him more compliant). PCP ordered CT A/P as patient also reported some abdominal pains.  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  Has been referred to nephrology (can't recall who), cardiology (Dr. Arun Bruno), and GI (Dr. Sabrina Allan).   Patient is of [place] descent.  He denies any family hx of hemochromatosis, iron overload, or hepatic issues.  Has not donated any blood.  Does not recall having any infectious episodes last year but reports he did have a significant flare in his ankle arthritis early last year. Will likely need B/L ankle replacements.   [de-identified] : This meeting is to review results of last session.  MRI of the liver was ordered - he had done 5/24/24 - Showed severe hepatic iron deposition. Small pancreatic cystic lesions without suspicious features could represent sequela of prior pancreatitis or side branch IPMN's - can be followed up in 1 year. 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

## 2024-07-12 ENCOUNTER — APPOINTMENT (OUTPATIENT)
Dept: UROLOGY | Facility: CLINIC | Age: 64
End: 2024-07-12

## 2024-10-21 PROBLEM — E83.110 HEMOCHROMATOSIS ASSOCIATED WITH MUTATION IN HFE GENE: Status: ACTIVE | Noted: 2024-10-21

## 2024-10-21 PROBLEM — R93.2 ABNORMAL MRI, LIVER: Status: ACTIVE | Noted: 2024-10-21

## 2024-10-30 ENCOUNTER — OUTPATIENT (OUTPATIENT)
Dept: OUTPATIENT SERVICES | Facility: HOSPITAL | Age: 64
LOS: 1 days | Discharge: HOME | End: 2024-10-30

## 2024-10-30 DIAGNOSIS — Z98.890 OTHER SPECIFIED POSTPROCEDURAL STATES: Chronic | ICD-10-CM

## 2024-10-30 DIAGNOSIS — R79.89 OTHER SPECIFIED ABNORMAL FINDINGS OF BLOOD CHEMISTRY: ICD-10-CM

## 2024-10-30 DIAGNOSIS — Z90.89 ACQUIRED ABSENCE OF OTHER ORGANS: Chronic | ICD-10-CM

## 2024-11-04 ENCOUNTER — RESULT REVIEW (OUTPATIENT)
Age: 64
End: 2024-11-04

## 2024-11-04 ENCOUNTER — APPOINTMENT (OUTPATIENT)
Dept: HEMATOLOGY ONCOLOGY | Facility: CLINIC | Age: 64
End: 2024-11-04

## 2024-11-04 LAB
BASOPHILS # BLD AUTO: 0.09 K/UL — SIGNIFICANT CHANGE UP (ref 0–0.2)
BASOPHILS NFR BLD AUTO: 1.1 % — SIGNIFICANT CHANGE UP (ref 0–2)
EOSINOPHIL # BLD AUTO: 0.14 K/UL — SIGNIFICANT CHANGE UP (ref 0–0.5)
EOSINOPHIL NFR BLD AUTO: 1.7 % — SIGNIFICANT CHANGE UP (ref 0–6)
HCT VFR BLD CALC: 42.3 % — SIGNIFICANT CHANGE UP (ref 39–50)
HGB BLD-MCNC: 14.8 G/DL — SIGNIFICANT CHANGE UP (ref 13–17)
IMM GRANULOCYTES NFR BLD AUTO: 0.2 % — SIGNIFICANT CHANGE UP (ref 0–0.9)
LYMPHOCYTES # BLD AUTO: 1.66 K/UL — SIGNIFICANT CHANGE UP (ref 1–3.3)
LYMPHOCYTES # BLD AUTO: 19.9 % — SIGNIFICANT CHANGE UP (ref 13–44)
MCHC RBC-ENTMCNC: 33.9 PG — SIGNIFICANT CHANGE UP (ref 27–34)
MCHC RBC-ENTMCNC: 35 G/DL — SIGNIFICANT CHANGE UP (ref 32–36)
MCV RBC AUTO: 96.8 FL — SIGNIFICANT CHANGE UP (ref 80–100)
MONOCYTES # BLD AUTO: 0.83 K/UL — SIGNIFICANT CHANGE UP (ref 0–0.9)
MONOCYTES NFR BLD AUTO: 9.9 % — SIGNIFICANT CHANGE UP (ref 2–14)
NEUTROPHILS # BLD AUTO: 5.62 K/UL — SIGNIFICANT CHANGE UP (ref 1.8–7.4)
NEUTROPHILS NFR BLD AUTO: 67.2 % — SIGNIFICANT CHANGE UP (ref 43–77)
NRBC # BLD: 0 /100 WBCS — SIGNIFICANT CHANGE UP (ref 0–0)
NRBC BLD-RTO: 0 /100 WBCS — SIGNIFICANT CHANGE UP (ref 0–0)
PLATELET # BLD AUTO: 200 K/UL — SIGNIFICANT CHANGE UP (ref 150–400)
RBC # BLD: 4.37 M/UL — SIGNIFICANT CHANGE UP (ref 4.2–5.8)
RBC # FLD: 12.2 % — SIGNIFICANT CHANGE UP (ref 10.3–14.5)
WBC # BLD: 8.36 K/UL — SIGNIFICANT CHANGE UP (ref 3.8–10.5)
WBC # FLD AUTO: 8.36 K/UL — SIGNIFICANT CHANGE UP (ref 3.8–10.5)

## 2024-11-05 LAB
FERRITIN SERPL-MCNC: 408 NG/ML
IRON SATN MFR SERPL: 73 %
IRON SERPL-MCNC: 196 UG/DL
TIBC SERPL-MCNC: 267 UG/DL
UIBC SERPL-MCNC: 71 UG/DL

## 2024-11-14 ENCOUNTER — NON-APPOINTMENT (OUTPATIENT)
Age: 64
End: 2024-11-14

## 2024-11-14 ENCOUNTER — APPOINTMENT (OUTPATIENT)
Dept: HEMATOLOGY ONCOLOGY | Facility: CLINIC | Age: 64
End: 2024-11-14

## 2024-11-14 VITALS
DIASTOLIC BLOOD PRESSURE: 76 MMHG | SYSTOLIC BLOOD PRESSURE: 122 MMHG | HEART RATE: 68 BPM | TEMPERATURE: 97.4 F | WEIGHT: 208 LBS | BODY MASS INDEX: 28.17 KG/M2 | OXYGEN SATURATION: 95 % | HEIGHT: 72 IN

## 2024-11-14 PROCEDURE — 99213 OFFICE O/P EST LOW 20 MIN: CPT

## 2024-11-14 PROCEDURE — G2211 COMPLEX E/M VISIT ADD ON: CPT

## 2024-12-05 PROBLEM — R93.2 ABNORMAL MRI, LIVER: Status: RESOLVED | Noted: 2024-10-21 | Resolved: 2024-12-05

## 2024-12-06 DIAGNOSIS — E83.110 HEREDITARY HEMOCHROMATOSIS: ICD-10-CM

## 2025-06-16 ENCOUNTER — OUTPATIENT (OUTPATIENT)
Dept: OUTPATIENT SERVICES | Facility: HOSPITAL | Age: 65
LOS: 1 days | Discharge: HOME | End: 2025-06-16

## 2025-06-16 DIAGNOSIS — Z98.890 OTHER SPECIFIED POSTPROCEDURAL STATES: Chronic | ICD-10-CM

## 2025-06-16 DIAGNOSIS — R79.89 OTHER SPECIFIED ABNORMAL FINDINGS OF BLOOD CHEMISTRY: ICD-10-CM

## 2025-06-16 DIAGNOSIS — E83.110 HEREDITARY HEMOCHROMATOSIS: ICD-10-CM

## 2025-06-16 DIAGNOSIS — Z90.89 ACQUIRED ABSENCE OF OTHER ORGANS: Chronic | ICD-10-CM

## 2025-06-19 ENCOUNTER — APPOINTMENT (OUTPATIENT)
Dept: HEMATOLOGY ONCOLOGY | Facility: CLINIC | Age: 65
End: 2025-06-19
Payer: COMMERCIAL

## 2025-06-19 ENCOUNTER — RESULT REVIEW (OUTPATIENT)
Age: 65
End: 2025-06-19

## 2025-06-19 VITALS
HEART RATE: 65 BPM | TEMPERATURE: 97.4 F | SYSTOLIC BLOOD PRESSURE: 104 MMHG | BODY MASS INDEX: 27.26 KG/M2 | DIASTOLIC BLOOD PRESSURE: 68 MMHG | WEIGHT: 201 LBS | OXYGEN SATURATION: 96 %

## 2025-06-19 LAB
BASOPHILS # BLD AUTO: 0.1 K/UL — SIGNIFICANT CHANGE UP (ref 0–0.2)
BASOPHILS NFR BLD AUTO: 1.7 % — SIGNIFICANT CHANGE UP (ref 0–2)
EOSINOPHIL # BLD AUTO: 0.25 K/UL — SIGNIFICANT CHANGE UP (ref 0–0.5)
EOSINOPHIL NFR BLD AUTO: 4.3 % — SIGNIFICANT CHANGE UP (ref 0–6)
HCT VFR BLD CALC: 42.3 % — SIGNIFICANT CHANGE UP (ref 39–50)
HGB BLD-MCNC: 14.6 G/DL — SIGNIFICANT CHANGE UP (ref 13–17)
IMM GRANULOCYTES # BLD AUTO: 0.01 K/UL — SIGNIFICANT CHANGE UP (ref 0–0.07)
IMM GRANULOCYTES NFR BLD AUTO: 0.2 % — SIGNIFICANT CHANGE UP (ref 0–0.9)
LYMPHOCYTES # BLD AUTO: 1.3 K/UL — SIGNIFICANT CHANGE UP (ref 1–3.3)
LYMPHOCYTES NFR BLD AUTO: 22.6 % — SIGNIFICANT CHANGE UP (ref 13–44)
MCHC RBC-ENTMCNC: 33.7 PG — SIGNIFICANT CHANGE UP (ref 27–34)
MCHC RBC-ENTMCNC: 34.5 G/DL — SIGNIFICANT CHANGE UP (ref 32–36)
MCV RBC AUTO: 97.7 FL — SIGNIFICANT CHANGE UP (ref 80–100)
MONOCYTES # BLD AUTO: 0.54 K/UL — SIGNIFICANT CHANGE UP (ref 0–0.9)
MONOCYTES NFR BLD AUTO: 9.4 % — SIGNIFICANT CHANGE UP (ref 2–14)
NEUTROPHILS # BLD AUTO: 3.55 K/UL — SIGNIFICANT CHANGE UP (ref 1.8–7.4)
NEUTROPHILS NFR BLD AUTO: 61.8 % — SIGNIFICANT CHANGE UP (ref 43–77)
NRBC # BLD AUTO: 0 K/UL — SIGNIFICANT CHANGE UP (ref 0–0)
NRBC # FLD: 0 K/UL — SIGNIFICANT CHANGE UP (ref 0–0)
NRBC BLD AUTO-RTO: 0 /100 WBCS — SIGNIFICANT CHANGE UP (ref 0–0)
PLATELET # BLD AUTO: 192 K/UL — SIGNIFICANT CHANGE UP (ref 150–400)
PMV BLD: 12.3 FL — SIGNIFICANT CHANGE UP (ref 7–13)
RBC # BLD: 4.33 M/UL — SIGNIFICANT CHANGE UP (ref 4.2–5.8)
RBC # FLD: 12.3 % — SIGNIFICANT CHANGE UP (ref 10.3–14.5)
WBC # BLD: 5.75 K/UL — SIGNIFICANT CHANGE UP (ref 3.8–10.5)
WBC # FLD AUTO: 5.75 K/UL — SIGNIFICANT CHANGE UP (ref 3.8–10.5)

## 2025-06-19 PROCEDURE — G2211 COMPLEX E/M VISIT ADD ON: CPT | Mod: NC

## 2025-06-19 PROCEDURE — 99213 OFFICE O/P EST LOW 20 MIN: CPT

## 2025-06-20 ENCOUNTER — NON-APPOINTMENT (OUTPATIENT)
Age: 65
End: 2025-06-20